# Patient Record
Sex: FEMALE | Race: WHITE | NOT HISPANIC OR LATINO | Employment: FULL TIME | URBAN - METROPOLITAN AREA
[De-identification: names, ages, dates, MRNs, and addresses within clinical notes are randomized per-mention and may not be internally consistent; named-entity substitution may affect disease eponyms.]

---

## 2023-07-06 DIAGNOSIS — E11.9 TYPE 2 DIABETES MELLITUS WITHOUT COMPLICATION, UNSPECIFIED WHETHER LONG TERM INSULIN USE (MULTI): ICD-10-CM

## 2023-07-06 DIAGNOSIS — I10 BENIGN ESSENTIAL HYPERTENSION: Primary | ICD-10-CM

## 2023-07-06 RX ORDER — LISINOPRIL 10 MG/1
10 TABLET ORAL DAILY
Qty: 90 TABLET | Refills: 0 | Status: SHIPPED | OUTPATIENT
Start: 2023-07-06 | End: 2023-07-06

## 2023-07-06 RX ORDER — METFORMIN HYDROCHLORIDE 1000 MG/1
1000 TABLET ORAL
Qty: 270 TABLET | Refills: 0 | Status: SHIPPED | OUTPATIENT
Start: 2023-07-06 | End: 2023-07-06

## 2023-07-06 RX ORDER — LISINOPRIL 10 MG/1
10 TABLET ORAL DAILY
COMMUNITY
Start: 2014-01-08 | End: 2023-07-06 | Stop reason: SDUPTHER

## 2023-07-06 RX ORDER — METFORMIN HYDROCHLORIDE 1000 MG/1
1000 TABLET ORAL
COMMUNITY
Start: 2015-05-21 | End: 2023-07-06 | Stop reason: SDUPTHER

## 2023-08-18 PROBLEM — K60.2 ANAL FISSURE: Status: ACTIVE | Noted: 2023-08-18

## 2023-08-18 PROBLEM — T75.3XXA MOTION SICKNESS: Status: ACTIVE | Noted: 2023-08-18

## 2023-08-18 PROBLEM — E55.9 VITAMIN D DEFICIENCY: Status: ACTIVE | Noted: 2023-08-18

## 2023-08-18 PROBLEM — M72.2 PLANTAR FASCIITIS: Status: ACTIVE | Noted: 2023-08-18

## 2023-08-18 PROBLEM — H52.202 ASTIGMATISM OF LEFT EYE: Status: ACTIVE | Noted: 2023-08-18

## 2023-08-18 PROBLEM — M25.562 BILATERAL KNEE PAIN: Status: ACTIVE | Noted: 2023-08-18

## 2023-08-18 PROBLEM — J84.10 PULMONARY FIBROSIS, UNSPECIFIED (MULTI): Status: ACTIVE | Noted: 2023-08-18

## 2023-08-18 PROBLEM — G56.00 CARPAL TUNNEL SYNDROME: Status: ACTIVE | Noted: 2023-08-18

## 2023-08-18 PROBLEM — H35.81 COTTON WOOL SPOTS: Status: ACTIVE | Noted: 2023-08-18

## 2023-08-18 PROBLEM — H35.032: Status: ACTIVE | Noted: 2023-08-18

## 2023-08-18 PROBLEM — H52.02 HYPERMETROPIA OF LEFT EYE: Status: ACTIVE | Noted: 2023-08-18

## 2023-08-18 PROBLEM — K40.91 RECURRENT RIGHT INGUINAL HERNIA: Status: ACTIVE | Noted: 2023-08-18

## 2023-08-18 PROBLEM — G89.29 CHRONIC LOW BACK PAIN: Status: ACTIVE | Noted: 2023-08-18

## 2023-08-18 PROBLEM — E11.9 T2DM (TYPE 2 DIABETES MELLITUS) (MULTI): Status: ACTIVE | Noted: 2023-08-18

## 2023-08-18 PROBLEM — E04.1 LEFT THYROID NODULE: Status: ACTIVE | Noted: 2023-08-18

## 2023-08-18 PROBLEM — R60.0 LEG EDEMA, RIGHT: Status: ACTIVE | Noted: 2023-08-18

## 2023-08-18 PROBLEM — M54.50 CHRONIC LOW BACK PAIN: Status: ACTIVE | Noted: 2023-08-18

## 2023-08-18 PROBLEM — K21.9 GERD WITHOUT ESOPHAGITIS: Status: ACTIVE | Noted: 2023-08-18

## 2023-08-18 PROBLEM — Z96.1 PSEUDOPHAKIA OF RIGHT EYE: Status: ACTIVE | Noted: 2023-08-18

## 2023-08-18 PROBLEM — E61.1 IRON DEFICIENCY: Status: ACTIVE | Noted: 2023-08-18

## 2023-08-18 PROBLEM — I10 HYPERTENSION: Status: ACTIVE | Noted: 2023-08-18

## 2023-08-18 PROBLEM — G47.33 OBSTRUCTIVE SLEEP APNEA: Status: ACTIVE | Noted: 2023-08-18

## 2023-08-18 PROBLEM — J39.2 THROAT DISORDER: Status: ACTIVE | Noted: 2023-08-18

## 2023-08-18 PROBLEM — H52.03 HYPEROPIA WITH PRESBYOPIA OF BOTH EYES: Status: ACTIVE | Noted: 2023-08-18

## 2023-08-18 PROBLEM — R21 RASH: Status: ACTIVE | Noted: 2023-08-18

## 2023-08-18 PROBLEM — M21.40 PES PLANUS, FLEXIBLE: Status: ACTIVE | Noted: 2023-08-18

## 2023-08-18 PROBLEM — M54.16 LUMBAR RADICULOPATHY: Status: ACTIVE | Noted: 2023-08-18

## 2023-08-18 PROBLEM — M25.561 BILATERAL KNEE PAIN: Status: ACTIVE | Noted: 2023-08-18

## 2023-08-18 PROBLEM — K62.89 ANAL PAIN: Status: ACTIVE | Noted: 2023-08-18

## 2023-08-18 PROBLEM — M76.829 POSTERIOR TIBIAL TENDON DYSFUNCTION: Status: ACTIVE | Noted: 2023-08-18

## 2023-08-18 PROBLEM — G47.00 INSOMNIA: Status: ACTIVE | Noted: 2023-08-18

## 2023-08-18 PROBLEM — Z98.890 HISTORY OF THYROID SURGERY: Status: ACTIVE | Noted: 2023-08-18

## 2023-08-18 PROBLEM — R06.02 SHORT OF BREATH ON EXERTION: Status: ACTIVE | Noted: 2023-08-18

## 2023-08-18 PROBLEM — E78.2 COMBINED HYPERLIPIDEMIA: Status: ACTIVE | Noted: 2023-08-18

## 2023-08-18 PROBLEM — E89.0 STATUS POST PARTIAL THYROIDECTOMY: Status: ACTIVE | Noted: 2023-08-18

## 2023-08-18 PROBLEM — H52.4 HYPEROPIA WITH PRESBYOPIA OF BOTH EYES: Status: ACTIVE | Noted: 2023-08-18

## 2023-08-18 PROBLEM — E66.01 MORBID OBESITY (MULTI): Status: ACTIVE | Noted: 2023-08-18

## 2023-08-18 PROBLEM — H25.041 POSTERIOR SUBCAPSULAR AGE-RELATED CATARACT, RIGHT EYE: Status: ACTIVE | Noted: 2023-08-18

## 2023-08-18 PROBLEM — E78.49 FAMILIAL COMBINED HYPERLIPIDEMIA: Status: ACTIVE | Noted: 2023-08-18

## 2023-08-18 RX ORDER — IBUPROFEN 200 MG
3 TABLET ORAL
COMMUNITY
Start: 2015-10-21

## 2023-08-18 RX ORDER — DAPAGLIFLOZIN 5 MG/1
1 TABLET, FILM COATED ORAL
COMMUNITY
Start: 2022-01-27 | End: 2024-01-30 | Stop reason: WASHOUT

## 2023-08-18 RX ORDER — BLOOD-GLUCOSE METER
EACH MISCELLANEOUS
COMMUNITY
Start: 2015-05-15 | End: 2024-01-24 | Stop reason: SDUPTHER

## 2023-08-18 RX ORDER — LISINOPRIL 20 MG/1
20 TABLET ORAL DAILY
COMMUNITY
End: 2024-01-30 | Stop reason: WASHOUT

## 2023-08-18 RX ORDER — MULTIVITAMIN
1 TABLET ORAL DAILY
COMMUNITY
End: 2024-01-30 | Stop reason: WASHOUT

## 2023-08-18 RX ORDER — NAFTIFINE HYDROCHLORIDE 20 MG/G
GEL TOPICAL
COMMUNITY
Start: 2021-08-23 | End: 2024-01-30 | Stop reason: WASHOUT

## 2023-08-18 RX ORDER — ALBUTEROL SULFATE 90 UG/1
AEROSOL, METERED RESPIRATORY (INHALATION)
COMMUNITY
Start: 2023-02-21 | End: 2024-01-30 | Stop reason: WASHOUT

## 2023-08-18 RX ORDER — ONDANSETRON 4 MG/1
1 TABLET, ORALLY DISINTEGRATING ORAL EVERY 4 HOURS PRN
COMMUNITY
Start: 2022-12-15 | End: 2024-01-24 | Stop reason: SDUPTHER

## 2023-08-18 RX ORDER — ONDANSETRON 8 MG/1
1 TABLET, ORALLY DISINTEGRATING ORAL EVERY 8 HOURS PRN
COMMUNITY
Start: 2015-12-21 | End: 2024-01-30 | Stop reason: WASHOUT

## 2023-08-18 RX ORDER — BENZONATATE 200 MG/1
CAPSULE ORAL
COMMUNITY
Start: 2023-02-21 | End: 2024-01-30 | Stop reason: WASHOUT

## 2023-08-18 RX ORDER — LISINOPRIL 5 MG/1
1 TABLET ORAL DAILY
COMMUNITY
End: 2024-01-30 | Stop reason: WASHOUT

## 2023-09-07 ENCOUNTER — TELEPHONE (OUTPATIENT)
Dept: PRIMARY CARE | Facility: CLINIC | Age: 63
End: 2023-09-07
Payer: COMMERCIAL

## 2023-09-07 DIAGNOSIS — Z00.00 HEALTH CARE MAINTENANCE: Primary | ICD-10-CM

## 2023-09-10 PROBLEM — H40.053 BILATERAL OCULAR HYPERTENSION: Status: ACTIVE | Noted: 2023-09-10

## 2023-09-15 ENCOUNTER — TELEPHONE (OUTPATIENT)
Dept: PRIMARY CARE | Facility: CLINIC | Age: 63
End: 2023-09-15
Payer: COMMERCIAL

## 2023-10-02 ENCOUNTER — TELEPHONE (OUTPATIENT)
Dept: PRIMARY CARE | Facility: CLINIC | Age: 63
End: 2023-10-02
Payer: COMMERCIAL

## 2023-10-08 DIAGNOSIS — Z00.00 HEALTH CARE MAINTENANCE: Primary | ICD-10-CM

## 2023-10-11 ENCOUNTER — TELEPHONE (OUTPATIENT)
Dept: ORTHOPEDIC SURGERY | Facility: HOSPITAL | Age: 63
End: 2023-10-11
Payer: COMMERCIAL

## 2023-10-13 ENCOUNTER — APPOINTMENT (OUTPATIENT)
Dept: ORTHOPEDIC SURGERY | Facility: HOSPITAL | Age: 63
End: 2023-10-13
Payer: COMMERCIAL

## 2023-10-13 ENCOUNTER — TELEPHONE (OUTPATIENT)
Dept: PRIMARY CARE | Facility: CLINIC | Age: 63
End: 2023-10-13
Payer: COMMERCIAL

## 2023-10-14 DIAGNOSIS — Z00.00 HEALTH CARE MAINTENANCE: Primary | ICD-10-CM

## 2023-10-24 ENCOUNTER — OFFICE VISIT (OUTPATIENT)
Dept: PRIMARY CARE | Facility: CLINIC | Age: 63
End: 2023-10-24
Payer: COMMERCIAL

## 2023-10-24 ENCOUNTER — PHARMACY VISIT (OUTPATIENT)
Dept: PHARMACY | Facility: CLINIC | Age: 63
End: 2023-10-24
Payer: COMMERCIAL

## 2023-10-24 ENCOUNTER — ANCILLARY PROCEDURE (OUTPATIENT)
Dept: RADIOLOGY | Facility: CLINIC | Age: 63
End: 2023-10-24
Payer: COMMERCIAL

## 2023-10-24 VITALS — SYSTOLIC BLOOD PRESSURE: 124 MMHG | WEIGHT: 293 LBS | BODY MASS INDEX: 60.94 KG/M2 | DIASTOLIC BLOOD PRESSURE: 72 MMHG

## 2023-10-24 DIAGNOSIS — E78.2 COMBINED HYPERLIPIDEMIA: ICD-10-CM

## 2023-10-24 DIAGNOSIS — I10 PRIMARY HYPERTENSION: ICD-10-CM

## 2023-10-24 DIAGNOSIS — I10 BENIGN ESSENTIAL HYPERTENSION: ICD-10-CM

## 2023-10-24 DIAGNOSIS — I10 PRIMARY HYPERTENSION: Primary | ICD-10-CM

## 2023-10-24 DIAGNOSIS — R06.02 SHORT OF BREATH ON EXERTION: ICD-10-CM

## 2023-10-24 DIAGNOSIS — G47.33 OBSTRUCTIVE SLEEP APNEA: ICD-10-CM

## 2023-10-24 PROCEDURE — 99214 OFFICE O/P EST MOD 30 MIN: CPT | Performed by: INTERNAL MEDICINE

## 2023-10-24 PROCEDURE — 3074F SYST BP LT 130 MM HG: CPT | Performed by: INTERNAL MEDICINE

## 2023-10-24 PROCEDURE — 3078F DIAST BP <80 MM HG: CPT | Performed by: INTERNAL MEDICINE

## 2023-10-24 PROCEDURE — 71046 X-RAY EXAM CHEST 2 VIEWS: CPT | Performed by: RADIOLOGY

## 2023-10-24 PROCEDURE — 4010F ACE/ARB THERAPY RXD/TAKEN: CPT | Performed by: INTERNAL MEDICINE

## 2023-10-24 PROCEDURE — RXMED WILLOW AMBULATORY MEDICATION CHARGE

## 2023-10-24 PROCEDURE — 71046 X-RAY EXAM CHEST 2 VIEWS: CPT | Mod: FY

## 2023-10-24 RX ORDER — LISINOPRIL 20 MG/1
20 TABLET ORAL DAILY
Qty: 90 TABLET | Refills: 1 | Status: SHIPPED | OUTPATIENT
Start: 2023-10-24 | End: 2024-04-15 | Stop reason: SDUPTHER

## 2023-10-24 RX ORDER — METFORMIN HYDROCHLORIDE 1000 MG/1
1000 TABLET ORAL
Qty: 180 TABLET | Refills: 1 | Status: SHIPPED | OUTPATIENT
Start: 2023-10-24 | End: 2024-04-15 | Stop reason: SDUPTHER

## 2023-10-24 NOTE — PROGRESS NOTES
Subjective   Patient ID: Cathryn Romeo is a 63 y.o. female who presents for No chief complaint on file..    HPI follow-up visit and sick visit no chest pain but has become more short of breath recall at some type of interstitial lung disease in the past was recently on steroids for sciatica towards the right although it did not help her lungs she when walking will have to stop several times to catch breath although has not been wheezing coughing up phlegm had fever she did have COVID earlier in the summer but not proximal to when she began being short of breath she has gained some weight    Review of Systems    Objective   There were no vitals taken for this visit.    Physical Exam vital signs noted alert and oriented x3 NCAT color is good no JVD chest clear to auscultation no wheezing no crackles CV regular rate and rhythm S1-S2 without murmur gallop or rub abdomen obese soft nontender normal active bowel sounds extremities no clubbing cyanosis or edema normal distal pulses    Assessment/Plan impression hypertension dyspnea KARLEE other diagnoses hyperlipidemia  Plan her lisinopril had been increased to 20 mg at her GYN visit will renew diet weight loss exercise as able and to effect change with the lipids chest x-ray PA lateral requisition made follow-up with echocardiogram if normal given history of other findings back hygiene back stretches less residual sciatic type discomfort review prior laboratory results and recheck for regular visit continue with CPAP therapy  tt40 cc21

## 2023-10-25 DIAGNOSIS — E11.9 TYPE 2 DIABETES MELLITUS WITHOUT COMPLICATION, UNSPECIFIED WHETHER LONG TERM INSULIN USE (MULTI): ICD-10-CM

## 2023-10-25 RX ORDER — LISINOPRIL 10 MG/1
10 TABLET ORAL DAILY
Qty: 90 TABLET | Refills: 0 | OUTPATIENT
Start: 2023-10-25 | End: 2024-10-24

## 2023-10-26 ENCOUNTER — APPOINTMENT (OUTPATIENT)
Dept: SLEEP MEDICINE | Facility: CLINIC | Age: 63
End: 2023-10-26
Payer: COMMERCIAL

## 2023-11-07 ENCOUNTER — APPOINTMENT (OUTPATIENT)
Dept: ORTHOPEDIC SURGERY | Facility: HOSPITAL | Age: 63
End: 2023-11-07
Payer: COMMERCIAL

## 2023-11-08 ENCOUNTER — TELEPHONE (OUTPATIENT)
Dept: PRIMARY CARE | Facility: CLINIC | Age: 63
End: 2023-11-08

## 2023-11-12 DIAGNOSIS — R06.02 SHORT OF BREATH ON EXERTION: Primary | ICD-10-CM

## 2023-11-12 DIAGNOSIS — I10 BENIGN ESSENTIAL HYPERTENSION: ICD-10-CM

## 2023-11-12 DIAGNOSIS — G47.33 OBSTRUCTIVE SLEEP APNEA: ICD-10-CM

## 2023-12-01 ENCOUNTER — HOSPITAL ENCOUNTER (OUTPATIENT)
Dept: CARDIOLOGY | Facility: HOSPITAL | Age: 63
Discharge: HOME | End: 2023-12-01
Payer: COMMERCIAL

## 2023-12-01 DIAGNOSIS — G47.33 OBSTRUCTIVE SLEEP APNEA: ICD-10-CM

## 2023-12-01 DIAGNOSIS — R06.00 DYSPNEA, UNSPECIFIED: ICD-10-CM

## 2023-12-01 DIAGNOSIS — R06.02 SHORT OF BREATH ON EXERTION: ICD-10-CM

## 2023-12-01 DIAGNOSIS — I10 BENIGN ESSENTIAL HYPERTENSION: ICD-10-CM

## 2023-12-01 LAB
AORTIC VALVE MEAN GRADIENT: 7
AORTIC VALVE PEAK VELOCITY: 1.83
AV PEAK GRADIENT: 13.4
AVA (PEAK VEL): 2.73
AVA (VTI): 2.66
EJECTION FRACTION APICAL 4 CHAMBER: 65.5
EJECTION FRACTION: 67
LEFT ATRIUM VOLUME AREA LENGTH INDEX BSA: 23.6
LEFT VENTRICLE INTERNAL DIMENSION DIASTOLE: 4.1 (ref 3.5–6)
LEFT VENTRICULAR OUTFLOW TRACT DIAMETER: 2.1
MITRAL VALVE E/A RATIO: 0.69
MITRAL VALVE E/E' RATIO: 8.7
RIGHT VENTRICLE PEAK SYSTOLIC PRESSURE: 15.5
TRICUSPID ANNULAR PLANE SYSTOLIC EXCURSION: 2.5

## 2023-12-01 PROCEDURE — 93306 TTE W/DOPPLER COMPLETE: CPT | Performed by: INTERNAL MEDICINE

## 2023-12-01 PROCEDURE — 93306 TTE W/DOPPLER COMPLETE: CPT

## 2023-12-08 ENCOUNTER — OFFICE VISIT (OUTPATIENT)
Dept: OPHTHALMOLOGY | Facility: CLINIC | Age: 63
End: 2023-12-08
Payer: COMMERCIAL

## 2023-12-08 DIAGNOSIS — E11.9 CONTROLLED TYPE 2 DIABETES MELLITUS WITHOUT COMPLICATION, UNSPECIFIED WHETHER LONG TERM INSULIN USE (MULTI): ICD-10-CM

## 2023-12-08 DIAGNOSIS — H40.003 GLAUCOMA SUSPECT OF BOTH EYES: Primary | ICD-10-CM

## 2023-12-08 PROCEDURE — RXMED WILLOW AMBULATORY MEDICATION CHARGE

## 2023-12-08 PROCEDURE — 92083 EXTENDED VISUAL FIELD XM: CPT | Performed by: OPHTHALMOLOGY

## 2023-12-08 PROCEDURE — 92134 CPTRZ OPH DX IMG PST SGM RTA: CPT | Mod: BILATERAL PROCEDURE | Performed by: OPHTHALMOLOGY

## 2023-12-08 PROCEDURE — 4010F ACE/ARB THERAPY RXD/TAKEN: CPT | Performed by: OPHTHALMOLOGY

## 2023-12-08 PROCEDURE — 99214 OFFICE O/P EST MOD 30 MIN: CPT | Performed by: OPHTHALMOLOGY

## 2023-12-08 RX ORDER — LATANOPROST 50 UG/ML
1 SOLUTION/ DROPS OPHTHALMIC NIGHTLY
Qty: 5 ML | Refills: 1 | Status: SHIPPED | OUTPATIENT
Start: 2023-12-08 | End: 2024-05-08 | Stop reason: WASHOUT

## 2023-12-08 RX ORDER — CLOTRIMAZOLE AND BETAMETHASONE DIPROPIONATE 10; .64 MG/G; MG/G
CREAM TOPICAL
COMMUNITY
Start: 2006-03-28 | End: 2024-01-30 | Stop reason: WASHOUT

## 2023-12-08 RX ORDER — ASPIRIN 325 MG
TABLET, DELAYED RELEASE (ENTERIC COATED) ORAL
COMMUNITY
Start: 2011-02-22 | End: 2024-01-30 | Stop reason: WASHOUT

## 2023-12-08 ASSESSMENT — REFRACTION_WEARINGRX
OS_ADD: +2.50
OD_CYLINDER: -1.25
OS_AXIS: 095
OD_AXIS: 096
OD_ADD: +2.50
OD_SPHERE: PLANO
OS_SPHERE: +0.25
OS_CYLINDER: -2.25

## 2023-12-08 ASSESSMENT — REFRACTION_MANIFEST
OS_AXIS: 090
OD_AXIS: 090
OS_AXIS: 090
OD_SPHERE: +1.00
OD_CYLINDER: -1.00
OS_CYLINDER: -2.50
OS_SPHERE: +1.25
OD_SPHERE: PLANO
OD_AXIS: 090
OD_CYLINDER: -2.25
OS_SPHERE: +0.75
OS_CYLINDER: -2.00

## 2023-12-08 ASSESSMENT — CONF VISUAL FIELD
OD_SUPERIOR_TEMPORAL_RESTRICTION: 0
METHOD: COUNTING FINGERS
OD_INFERIOR_TEMPORAL_RESTRICTION: 0
OD_SUPERIOR_NASAL_RESTRICTION: 0
OS_NORMAL: 1
OS_SUPERIOR_TEMPORAL_RESTRICTION: 0
OS_INFERIOR_TEMPORAL_RESTRICTION: 0
OS_SUPERIOR_NASAL_RESTRICTION: 0
OD_NORMAL: 1
OD_INFERIOR_NASAL_RESTRICTION: 0
OS_INFERIOR_NASAL_RESTRICTION: 0

## 2023-12-08 ASSESSMENT — VISUAL ACUITY
OD_CC+: -3
OD_CC: 20/20
OS_CC+: -2
OS_CC: 20/20
CORRECTION_TYPE: GLASSES
METHOD: SNELLEN - LINEAR

## 2023-12-08 ASSESSMENT — CUP TO DISC RATIO
OS_RATIO: .3
OD_RATIO: .3

## 2023-12-08 ASSESSMENT — EXTERNAL EXAM - LEFT EYE: OS_EXAM: NORMAL

## 2023-12-08 ASSESSMENT — ENCOUNTER SYMPTOMS
RESPIRATORY NEGATIVE: 0
CONSTITUTIONAL NEGATIVE: 0
NEUROLOGICAL NEGATIVE: 0
GASTROINTESTINAL NEGATIVE: 0
EYES NEGATIVE: 0
MUSCULOSKELETAL NEGATIVE: 0
HEMATOLOGIC/LYMPHATIC NEGATIVE: 0
CARDIOVASCULAR NEGATIVE: 0
ALLERGIC/IMMUNOLOGIC NEGATIVE: 0
PSYCHIATRIC NEGATIVE: 0
ENDOCRINE NEGATIVE: 0

## 2023-12-08 ASSESSMENT — TONOMETRY
IOP_METHOD: GOLDMANN APPLANATION
OS_IOP_MMHG: 24
OD_IOP_MMHG: 23

## 2023-12-08 ASSESSMENT — PACHYMETRY
OD_CT(UM): 594
OS_CT(UM): 602

## 2023-12-08 ASSESSMENT — EXTERNAL EXAM - RIGHT EYE: OD_EXAM: NORMAL

## 2023-12-08 ASSESSMENT — SLIT LAMP EXAM - LIDS
COMMENTS: GOOD POSITION
COMMENTS: GOOD POSITION

## 2023-12-08 NOTE — PROGRESS NOTES
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

## 2023-12-11 ENCOUNTER — PHARMACY VISIT (OUTPATIENT)
Dept: PHARMACY | Facility: CLINIC | Age: 63
End: 2023-12-11
Payer: COMMERCIAL

## 2023-12-15 ENCOUNTER — APPOINTMENT (OUTPATIENT)
Dept: ORTHOPEDIC SURGERY | Facility: CLINIC | Age: 63
End: 2023-12-15
Payer: COMMERCIAL

## 2023-12-18 ENCOUNTER — APPOINTMENT (OUTPATIENT)
Dept: PRIMARY CARE | Facility: CLINIC | Age: 63
End: 2023-12-18
Payer: COMMERCIAL

## 2024-01-03 ENCOUNTER — APPOINTMENT (OUTPATIENT)
Dept: SLEEP MEDICINE | Facility: CLINIC | Age: 64
End: 2024-01-03
Payer: COMMERCIAL

## 2024-01-09 ENCOUNTER — APPOINTMENT (OUTPATIENT)
Dept: ORTHOPEDIC SURGERY | Facility: HOSPITAL | Age: 64
End: 2024-01-09
Payer: COMMERCIAL

## 2024-01-09 ASSESSMENT — PROMIS GLOBAL HEALTH SCALE
RATE_PHYSICAL_HEALTH: FAIR
RATE_AVERAGE_PAIN: 7
RATE_MENTAL_HEALTH: VERY GOOD
RATE_AVERAGE_FATIGUE: MODERATE
RATE_QUALITY_OF_LIFE: FAIR
CARRYOUT_PHYSICAL_ACTIVITIES: MODERATELY
CARRYOUT_SOCIAL_ACTIVITIES: GOOD
RATE_GENERAL_HEALTH: FAIR
RATE_SOCIAL_SATISFACTION: VERY GOOD
EMOTIONAL_PROBLEMS: NEVER

## 2024-01-11 ENCOUNTER — OFFICE VISIT (OUTPATIENT)
Dept: PRIMARY CARE | Facility: CLINIC | Age: 64
End: 2024-01-11
Payer: COMMERCIAL

## 2024-01-11 VITALS — WEIGHT: 293 LBS | DIASTOLIC BLOOD PRESSURE: 76 MMHG | BODY MASS INDEX: 60.94 KG/M2 | SYSTOLIC BLOOD PRESSURE: 141 MMHG

## 2024-01-11 DIAGNOSIS — G47.33 OBSTRUCTIVE SLEEP APNEA: ICD-10-CM

## 2024-01-11 DIAGNOSIS — E78.2 COMBINED HYPERLIPIDEMIA: ICD-10-CM

## 2024-01-11 DIAGNOSIS — I10 BENIGN ESSENTIAL HYPERTENSION: ICD-10-CM

## 2024-01-11 DIAGNOSIS — Z00.00 HEALTH CARE MAINTENANCE: Primary | ICD-10-CM

## 2024-01-11 DIAGNOSIS — J84.10 PULMONARY FIBROSIS, UNSPECIFIED (MULTI): ICD-10-CM

## 2024-01-11 DIAGNOSIS — E11.9 TYPE 2 DIABETES MELLITUS WITHOUT COMPLICATION, WITHOUT LONG-TERM CURRENT USE OF INSULIN (MULTI): ICD-10-CM

## 2024-01-11 DIAGNOSIS — E66.01 MORBID OBESITY (MULTI): ICD-10-CM

## 2024-01-11 LAB
ALBUMIN SERPL BCP-MCNC: 4.3 G/DL (ref 3.4–5)
ALP SERPL-CCNC: 75 U/L (ref 33–136)
ALT SERPL W P-5'-P-CCNC: 22 U/L (ref 7–45)
ANION GAP SERPL CALC-SCNC: 16 MMOL/L (ref 10–20)
AST SERPL W P-5'-P-CCNC: 18 U/L (ref 9–39)
BILIRUB SERPL-MCNC: 0.3 MG/DL (ref 0–1.2)
BUN SERPL-MCNC: 15 MG/DL (ref 6–23)
CALCIUM SERPL-MCNC: 9.8 MG/DL (ref 8.6–10.6)
CHLORIDE SERPL-SCNC: 102 MMOL/L (ref 98–107)
CHOLEST SERPL-MCNC: 204 MG/DL (ref 0–199)
CHOLESTEROL/HDL RATIO: 4.1
CO2 SERPL-SCNC: 25 MMOL/L (ref 21–32)
CREAT SERPL-MCNC: 0.62 MG/DL (ref 0.5–1.05)
EGFRCR SERPLBLD CKD-EPI 2021: >90 ML/MIN/1.73M*2
ERYTHROCYTE [DISTWIDTH] IN BLOOD BY AUTOMATED COUNT: 13.9 % (ref 11.5–14.5)
EST. AVERAGE GLUCOSE BLD GHB EST-MCNC: 126 MG/DL
GLUCOSE SERPL-MCNC: 140 MG/DL (ref 74–99)
HBA1C MFR BLD: 6 %
HCT VFR BLD AUTO: 46.7 % (ref 36–46)
HDLC SERPL-MCNC: 49.3 MG/DL
HGB BLD-MCNC: 14.2 G/DL (ref 12–16)
LDLC SERPL CALC-MCNC: 106 MG/DL
MCH RBC QN AUTO: 27.8 PG (ref 26–34)
MCHC RBC AUTO-ENTMCNC: 30.4 G/DL (ref 32–36)
MCV RBC AUTO: 92 FL (ref 80–100)
NON HDL CHOLESTEROL: 155 MG/DL (ref 0–149)
NRBC BLD-RTO: 0 /100 WBCS (ref 0–0)
PLATELET # BLD AUTO: 256 X10*3/UL (ref 150–450)
POTASSIUM SERPL-SCNC: 4.2 MMOL/L (ref 3.5–5.3)
PROT SERPL-MCNC: 7.5 G/DL (ref 6.4–8.2)
RBC # BLD AUTO: 5.1 X10*6/UL (ref 4–5.2)
SODIUM SERPL-SCNC: 139 MMOL/L (ref 136–145)
TRIGL SERPL-MCNC: 242 MG/DL (ref 0–149)
VLDL: 48 MG/DL (ref 0–40)
WBC # BLD AUTO: 8.3 X10*3/UL (ref 4.4–11.3)

## 2024-01-11 PROCEDURE — 99396 PREV VISIT EST AGE 40-64: CPT | Performed by: INTERNAL MEDICINE

## 2024-01-11 PROCEDURE — 3078F DIAST BP <80 MM HG: CPT | Performed by: INTERNAL MEDICINE

## 2024-01-11 PROCEDURE — 36415 COLL VENOUS BLD VENIPUNCTURE: CPT

## 2024-01-11 PROCEDURE — 80053 COMPREHEN METABOLIC PANEL: CPT

## 2024-01-11 PROCEDURE — 80061 LIPID PANEL: CPT

## 2024-01-11 PROCEDURE — 93000 ELECTROCARDIOGRAM COMPLETE: CPT | Performed by: INTERNAL MEDICINE

## 2024-01-11 PROCEDURE — 3044F HG A1C LEVEL LT 7.0%: CPT | Performed by: INTERNAL MEDICINE

## 2024-01-11 PROCEDURE — 4010F ACE/ARB THERAPY RXD/TAKEN: CPT | Performed by: INTERNAL MEDICINE

## 2024-01-11 PROCEDURE — 85027 COMPLETE CBC AUTOMATED: CPT

## 2024-01-11 PROCEDURE — 83036 HEMOGLOBIN GLYCOSYLATED A1C: CPT

## 2024-01-11 PROCEDURE — 3049F LDL-C 100-129 MG/DL: CPT | Performed by: INTERNAL MEDICINE

## 2024-01-11 PROCEDURE — 3077F SYST BP >= 140 MM HG: CPT | Performed by: INTERNAL MEDICINE

## 2024-01-11 NOTE — PROGRESS NOTES
Subjective   Patient ID: Cathryn Romeo is a 63 y.o. female who presents for No chief complaint on file..    HPI regularCPE see updated front sheet no chest pain no shortness of breath but always somewhat short of breath hide her echocardiogram had chest x-ray has had pulmonary fibrosis in the past may obtain chest CT and follow-up no side effect with medication bowels normal    Past medical history morbid obesity hypertension hyperlipidemia KARLEE diabetes mellitus medications noted and unchanged    Allergies noted and unchanged    Social history no tobacco    Family history noted and unchanged        Prevention some exercise some prior blood work reviewed discussed with colonoscopy mammogram echocardiogram     Review of Systems    Objective   There were no vitals taken for this visit.    Physical Exam vital signs noted alert and oriented x 3 NCAT PERRLA EOMI nares without discharge OP benign TM normal bilateral EAC clear bilateral no AC nodes no JVD or bruit no thyromegaly chest clear to auscultation and percussion CV regular rate and rhythm S1-S2 without murmur gallop or rub abdomen obese soft nontender normal active bowel sounds no rebound or guarding no HSM LS spine normal curvature negative straight leg raise negative logroll negative SI joint tenderness extremities no clubbing cyanosis or edema normal distal pulses DTR 2+    Assessment/Plan impression General medical examination diabetes mellitus KARLEE hypertension hyperlipidemia morbid obesity pulmonary fibrosis  Plan check EKG advised on heart check CT scan noncontrasted requisition made and advised on lungs consider pulmonary consult the pulmonary artery pressure on the echocardiogram was normal check A1c advised on long-term blood sugar test check comprehensive panel advised on glucose potassium and kidney function as well as liver function test check CBC advised on blood count check lipid panel advised on cholesterol profile diet weight loss exercise  consideration for Mounjaro after her test good water consumption recheck based on above TT 60 cc 31

## 2024-01-16 PROCEDURE — RXMED WILLOW AMBULATORY MEDICATION CHARGE

## 2024-01-17 ENCOUNTER — PHARMACY VISIT (OUTPATIENT)
Dept: PHARMACY | Facility: CLINIC | Age: 64
End: 2024-01-17
Payer: COMMERCIAL

## 2024-01-23 PROCEDURE — RXMED WILLOW AMBULATORY MEDICATION CHARGE

## 2024-01-24 ENCOUNTER — PHARMACY VISIT (OUTPATIENT)
Dept: PHARMACY | Facility: CLINIC | Age: 64
End: 2024-01-24
Payer: COMMERCIAL

## 2024-01-24 DIAGNOSIS — E11.9 TYPE 2 DIABETES MELLITUS WITHOUT COMPLICATION, WITHOUT LONG-TERM CURRENT USE OF INSULIN (MULTI): ICD-10-CM

## 2024-01-24 DIAGNOSIS — Z00.00 HEALTH CARE MAINTENANCE: Primary | ICD-10-CM

## 2024-01-24 RX ORDER — BLOOD-GLUCOSE METER
EACH MISCELLANEOUS
Qty: 100 STRIP | Refills: 1 | Status: SHIPPED | OUTPATIENT
Start: 2024-01-24

## 2024-01-24 RX ORDER — ONDANSETRON 4 MG/1
4 TABLET, ORALLY DISINTEGRATING ORAL EVERY 4 HOURS PRN
Qty: 20 TABLET | Refills: 0 | Status: SHIPPED | OUTPATIENT
Start: 2024-01-24

## 2024-01-25 ENCOUNTER — PHARMACY VISIT (OUTPATIENT)
Dept: PHARMACY | Facility: CLINIC | Age: 64
End: 2024-01-25
Payer: COMMERCIAL

## 2024-01-25 PROCEDURE — RXMED WILLOW AMBULATORY MEDICATION CHARGE

## 2024-01-29 ENCOUNTER — HOSPITAL ENCOUNTER (OUTPATIENT)
Dept: RADIOLOGY | Facility: CLINIC | Age: 64
Discharge: HOME | End: 2024-01-29
Payer: COMMERCIAL

## 2024-01-29 DIAGNOSIS — J84.10 PULMONARY FIBROSIS, UNSPECIFIED (MULTI): ICD-10-CM

## 2024-01-29 PROCEDURE — 71250 CT THORAX DX C-: CPT | Performed by: STUDENT IN AN ORGANIZED HEALTH CARE EDUCATION/TRAINING PROGRAM

## 2024-01-29 PROCEDURE — 71250 CT THORAX DX C-: CPT

## 2024-01-30 ENCOUNTER — OFFICE VISIT (OUTPATIENT)
Dept: OTOLARYNGOLOGY | Facility: CLINIC | Age: 64
End: 2024-01-30
Payer: COMMERCIAL

## 2024-01-30 VITALS — BODY MASS INDEX: 51.91 KG/M2 | HEIGHT: 63 IN | WEIGHT: 293 LBS

## 2024-01-30 DIAGNOSIS — K21.9 GASTROESOPHAGEAL REFLUX DISEASE WITHOUT ESOPHAGITIS: ICD-10-CM

## 2024-01-30 DIAGNOSIS — J37.0 CHRONIC LARYNGITIS: Primary | ICD-10-CM

## 2024-01-30 DIAGNOSIS — J30.89 NON-SEASONAL ALLERGIC RHINITIS, UNSPECIFIED TRIGGER: ICD-10-CM

## 2024-01-30 DIAGNOSIS — R49.0 CHRONIC HOARSENESS: ICD-10-CM

## 2024-01-30 PROCEDURE — 4010F ACE/ARB THERAPY RXD/TAKEN: CPT | Performed by: OTOLARYNGOLOGY

## 2024-01-30 PROCEDURE — RXMED WILLOW AMBULATORY MEDICATION CHARGE

## 2024-01-30 PROCEDURE — 3049F LDL-C 100-129 MG/DL: CPT | Performed by: OTOLARYNGOLOGY

## 2024-01-30 PROCEDURE — 31575 DIAGNOSTIC LARYNGOSCOPY: CPT | Performed by: OTOLARYNGOLOGY

## 2024-01-30 PROCEDURE — 3044F HG A1C LEVEL LT 7.0%: CPT | Performed by: OTOLARYNGOLOGY

## 2024-01-30 PROCEDURE — 99204 OFFICE O/P NEW MOD 45 MIN: CPT | Performed by: OTOLARYNGOLOGY

## 2024-01-30 RX ORDER — FLUTICASONE PROPIONATE 50 MCG
2 SPRAY, SUSPENSION (ML) NASAL DAILY
Qty: 48 G | Refills: 0 | Status: SHIPPED | OUTPATIENT
Start: 2024-01-30 | End: 2024-04-09 | Stop reason: SDUPTHER

## 2024-01-30 RX ORDER — AZELASTINE 1 MG/ML
2 SPRAY, METERED NASAL 2 TIMES DAILY
Qty: 90 ML | Refills: 0 | Status: SHIPPED | OUTPATIENT
Start: 2024-01-30 | End: 2024-04-13 | Stop reason: SDUPTHER

## 2024-01-30 RX ORDER — OMEPRAZOLE 40 MG/1
40 CAPSULE, DELAYED RELEASE ORAL DAILY
Qty: 90 CAPSULE | Refills: 0 | Status: SHIPPED | OUTPATIENT
Start: 2024-01-30 | End: 2024-04-09 | Stop reason: SDUPTHER

## 2024-01-30 ASSESSMENT — ENCOUNTER SYMPTOMS
FATIGUE: 1
HEADACHES: 1
SHORTNESS OF BREATH: 1
RHINORRHEA: 1
COUGH: 1

## 2024-01-30 NOTE — PROGRESS NOTES
Subjective   Patient ID: Cathryn Romeo is a 63 y.o. female  HPI  Patient is complaining of a chronic history of irritation in her throat with frequent clearing of the throat.  She also complains of chronic postnasal drainage.  She has no purulence from her nose and no facial pain.  She has no hemoptysis or dysphagia.  She does complain of mild hoarseness associated with the throat clearing.  Review of Systems   Constitutional:  Positive for fatigue.   HENT:  Positive for congestion, hearing loss, postnasal drip and rhinorrhea.    Respiratory:  Positive for cough and shortness of breath.    Neurological:  Positive for headaches.       Objective   Physical Exam  The following elements of a brief ear nose and throat exam were performed: External ear canals and tympanic membranes, external nose and nasal passages, oral cavity, palpation of the neck, percussion of the face, palpation of the thyroid.    There is nasal edema and the turbinates are pale.  There is no purulence or facial tenderness noted.  Indirect mirror laryngoscopy is not possible due to a strong gag reflex and the tonsils are surgically absent.  She does have severe elevation of the BMI at 61.  The remainder of her exam was within normal limits.  Fiberoptic laryngoscopy was offered in light of the chronic laryngeal irritation and mild dysphonia.  The nasal cavity and nasopharynx and hypopharynx were noted to be clear.  There was good vocal cord motion bilaterally with posterior interarytenoid edema.    Laryngoscopy    Date/Time: 1/30/2024 11:50 AM    Performed by: Sahil Bailey MD  Authorized by: Sahil Bailey MD    Consent:     Consent obtained:  Verbal    Risks discussed:  Pain and infection  Procedure details:     Indications: hoarseness, dysphagia, or aspiration      Meds administered: Lidocaine and Afrin nasal sprays.    Instrument: flexible fiberoptic laryngoscope      Scope location: bilateral nare        Assessment/Plan   Diagnoses and all  orders for this visit:  Chronic laryngitis (Primary)  Chronic hoarseness  -     Laryngoscopy  Non-seasonal allergic rhinitis, unspecified trigger  -     fluticasone (Flonase) 50 mcg/actuation nasal spray; Administer 2 sprays into each nostril once daily. Shake gently. Before first use, prime pump. After use, clean tip and replace cap.  -     azelastine (Astelin) 137 mcg (0.1 %) nasal spray; Administer 2 sprays into each nostril 2 times a day.  Gastroesophageal reflux disease without esophagitis  -     omeprazole (PriLOSEC) 40 mg DR capsule; Take 1 capsule (40 mg) by mouth once daily. Do not crush or chew.     1.  Chronic allergic rhinitis with postnasal drainage.  The patient was started on Flonase and Astelin nasal sprays.  2.  Chronic gastroesophageal reflux with chronic laryngitis exacerbated by the severe elevation of the BMI.  The patient was started on omeprazole at 40 mg/day and she was advised to follow acid reflux precautions and consider weight reduction.  She will follow-up in 1 month.

## 2024-01-31 PROCEDURE — RXMED WILLOW AMBULATORY MEDICATION CHARGE

## 2024-02-02 ENCOUNTER — TELEPHONE (OUTPATIENT)
Dept: PRIMARY CARE | Facility: CLINIC | Age: 64
End: 2024-02-02
Payer: COMMERCIAL

## 2024-02-05 ENCOUNTER — PHARMACY VISIT (OUTPATIENT)
Dept: PHARMACY | Facility: CLINIC | Age: 64
End: 2024-02-05
Payer: COMMERCIAL

## 2024-02-06 DIAGNOSIS — D73.5 SPLENIC INFARCT: Primary | ICD-10-CM

## 2024-02-20 ENCOUNTER — HOSPITAL ENCOUNTER (OUTPATIENT)
Dept: RADIOLOGY | Facility: CLINIC | Age: 64
End: 2024-02-20
Payer: COMMERCIAL

## 2024-02-28 ENCOUNTER — HOSPITAL ENCOUNTER (OUTPATIENT)
Dept: RADIOLOGY | Facility: CLINIC | Age: 64
Discharge: HOME | End: 2024-02-28
Payer: COMMERCIAL

## 2024-02-28 DIAGNOSIS — D73.5 SPLENIC INFARCT: ICD-10-CM

## 2024-02-28 PROCEDURE — 76700 US EXAM ABDOM COMPLETE: CPT

## 2024-02-28 PROCEDURE — 76700 US EXAM ABDOM COMPLETE: CPT | Performed by: RADIOLOGY

## 2024-02-29 ENCOUNTER — APPOINTMENT (OUTPATIENT)
Dept: OTOLARYNGOLOGY | Facility: CLINIC | Age: 64
End: 2024-02-29
Payer: COMMERCIAL

## 2024-03-07 ENCOUNTER — TELEPHONE (OUTPATIENT)
Dept: PRIMARY CARE | Facility: CLINIC | Age: 64
End: 2024-03-07

## 2024-03-07 ENCOUNTER — APPOINTMENT (OUTPATIENT)
Dept: OTOLARYNGOLOGY | Facility: CLINIC | Age: 64
End: 2024-03-07
Payer: COMMERCIAL

## 2024-03-17 DIAGNOSIS — Q45.3 ANOMALY OF PANCREAS: Primary | ICD-10-CM

## 2024-04-09 ENCOUNTER — OFFICE VISIT (OUTPATIENT)
Dept: OTOLARYNGOLOGY | Facility: CLINIC | Age: 64
End: 2024-04-09
Payer: COMMERCIAL

## 2024-04-09 DIAGNOSIS — J30.89 NON-SEASONAL ALLERGIC RHINITIS, UNSPECIFIED TRIGGER: ICD-10-CM

## 2024-04-09 DIAGNOSIS — R49.0 CHRONIC HOARSENESS: ICD-10-CM

## 2024-04-09 DIAGNOSIS — J37.0 CHRONIC LARYNGITIS: ICD-10-CM

## 2024-04-09 DIAGNOSIS — K21.9 GASTROESOPHAGEAL REFLUX DISEASE WITHOUT ESOPHAGITIS: Primary | ICD-10-CM

## 2024-04-09 PROCEDURE — 3049F LDL-C 100-129 MG/DL: CPT | Performed by: OTOLARYNGOLOGY

## 2024-04-09 PROCEDURE — RXMED WILLOW AMBULATORY MEDICATION CHARGE

## 2024-04-09 PROCEDURE — 99214 OFFICE O/P EST MOD 30 MIN: CPT | Performed by: OTOLARYNGOLOGY

## 2024-04-09 PROCEDURE — 3044F HG A1C LEVEL LT 7.0%: CPT | Performed by: OTOLARYNGOLOGY

## 2024-04-09 PROCEDURE — 1036F TOBACCO NON-USER: CPT | Performed by: OTOLARYNGOLOGY

## 2024-04-09 PROCEDURE — 4010F ACE/ARB THERAPY RXD/TAKEN: CPT | Performed by: OTOLARYNGOLOGY

## 2024-04-09 RX ORDER — MINERAL OIL
180 ENEMA (ML) RECTAL DAILY PRN
Qty: 90 TABLET | Refills: 1 | Status: SHIPPED | OUTPATIENT
Start: 2024-04-09

## 2024-04-09 RX ORDER — OMEPRAZOLE 40 MG/1
40 CAPSULE, DELAYED RELEASE ORAL DAILY
Qty: 90 CAPSULE | Refills: 1 | Status: SHIPPED | OUTPATIENT
Start: 2024-04-09

## 2024-04-09 RX ORDER — FLUTICASONE PROPIONATE 50 MCG
2 SPRAY, SUSPENSION (ML) NASAL DAILY
Qty: 48 G | Refills: 1 | Status: SHIPPED | OUTPATIENT
Start: 2024-04-09

## 2024-04-09 NOTE — PROGRESS NOTES
Subjective   Patient ID: Cathryn Romeo is a 63 y.o. female  HPI  Patient presents for follow-up for chronic allergic rhinitis and chronic gastroesophageal reflux with chronic laryngitis and hoarseness.  She is feeling better and her voice is improved.  She continues to complain of postnasal drainage and she did not tolerate the Astelin.  She continues to use the Flonase and the omeprazole.  Review of Systems    Objective   Physical Exam  There is nasal edema and the turbinates are pale.  There is no purulence or facial tenderness noted.  There is mild raspiness noted in her voice.  Indirect mirror laryngoscopy is limited due to a strong gag reflex and there is good vocal cord motion noted bilaterally.    Assessment/Plan   Diagnoses and all orders for this visit:  Gastroesophageal reflux disease without esophagitis (Primary)  -     omeprazole (PriLOSEC) 40 mg DR capsule; Take 1 capsule (40 mg) by mouth once daily. Do not crush or chew.  -     Referral to Gastroenterology; Future  Non-seasonal allergic rhinitis, unspecified trigger  -     fluticasone (Flonase) 50 mcg/actuation nasal spray; Administer 2 sprays into each nostril once daily. Shake gently. Before first use, prime pump. After use, clean tip and replace cap.  -     fexofenadine (Allegra) 180 mg tablet; Take 1 tablet (180 mg) by mouth once daily as needed (allergy symptoms).  Chronic laryngitis  Chronic hoarseness     1.  Chronic gastroesophageal reflux with chronic laryngitis improved with omeprazole and acid reflux precautions.  The patient was referred to gastroenterologist for further management and EGD was also ordered.  2.  Chronic allergic rhinitis improved with Flonase with some persistent allergy symptoms and intolerance of Astelin.  The patient was started on Allegra with the Flonase.  She will follow-up in 6 months.

## 2024-04-10 ENCOUNTER — HOSPITAL ENCOUNTER (OUTPATIENT)
Dept: RADIOLOGY | Facility: CLINIC | Age: 64
Discharge: HOME | End: 2024-04-10
Payer: COMMERCIAL

## 2024-04-10 DIAGNOSIS — Q45.3 ANOMALY OF PANCREAS: ICD-10-CM

## 2024-04-10 PROCEDURE — 74183 MRI ABD W/O CNTR FLWD CNTR: CPT

## 2024-04-10 PROCEDURE — A9575 INJ GADOTERATE MEGLUMI 0.1ML: HCPCS | Performed by: INTERNAL MEDICINE

## 2024-04-10 PROCEDURE — 2550000001 HC RX 255 CONTRASTS: Performed by: INTERNAL MEDICINE

## 2024-04-10 RX ORDER — GADOTERATE MEGLUMINE 376.9 MG/ML
30 INJECTION INTRAVENOUS
Status: COMPLETED | OUTPATIENT
Start: 2024-04-10 | End: 2024-04-10

## 2024-04-10 RX ADMIN — GADOTERATE MEGLUMINE 30 ML: 376.9 INJECTION INTRAVENOUS at 14:21

## 2024-04-10 ASSESSMENT — ENCOUNTER SYMPTOMS
DEPRESSION: 0
OCCASIONAL FEELINGS OF UNSTEADINESS: 0
LOSS OF SENSATION IN FEET: 0

## 2024-04-13 DIAGNOSIS — J30.89 NON-SEASONAL ALLERGIC RHINITIS, UNSPECIFIED TRIGGER: ICD-10-CM

## 2024-04-13 RX ORDER — AZELASTINE 1 MG/ML
2 SPRAY, METERED NASAL 2 TIMES DAILY
Qty: 90 ML | Refills: 0 | Status: SHIPPED | OUTPATIENT
Start: 2024-04-13 | End: 2024-05-08 | Stop reason: WASHOUT

## 2024-04-15 DIAGNOSIS — I10 BENIGN ESSENTIAL HYPERTENSION: ICD-10-CM

## 2024-04-15 DIAGNOSIS — I10 PRIMARY HYPERTENSION: ICD-10-CM

## 2024-04-17 ENCOUNTER — PHARMACY VISIT (OUTPATIENT)
Dept: PHARMACY | Facility: CLINIC | Age: 64
End: 2024-04-17
Payer: COMMERCIAL

## 2024-04-17 RX ORDER — METFORMIN HYDROCHLORIDE 1000 MG/1
1000 TABLET ORAL
Qty: 180 TABLET | Refills: 1 | Status: SHIPPED | OUTPATIENT
Start: 2024-04-17

## 2024-04-17 RX ORDER — LISINOPRIL 20 MG/1
20 TABLET ORAL DAILY
Qty: 90 TABLET | Refills: 1 | Status: SHIPPED | OUTPATIENT
Start: 2024-04-17

## 2024-04-18 PROCEDURE — RXMED WILLOW AMBULATORY MEDICATION CHARGE

## 2024-04-19 ENCOUNTER — APPOINTMENT (OUTPATIENT)
Dept: OPHTHALMOLOGY | Facility: CLINIC | Age: 64
End: 2024-04-19
Payer: COMMERCIAL

## 2024-04-19 ENCOUNTER — PHARMACY VISIT (OUTPATIENT)
Dept: PHARMACY | Facility: CLINIC | Age: 64
End: 2024-04-19
Payer: COMMERCIAL

## 2024-05-08 ENCOUNTER — OFFICE VISIT (OUTPATIENT)
Dept: SLEEP MEDICINE | Facility: CLINIC | Age: 64
End: 2024-05-08
Payer: COMMERCIAL

## 2024-05-08 VITALS
BODY MASS INDEX: 53.92 KG/M2 | DIASTOLIC BLOOD PRESSURE: 84 MMHG | HEART RATE: 83 BPM | TEMPERATURE: 97.8 F | WEIGHT: 293 LBS | HEIGHT: 62 IN | SYSTOLIC BLOOD PRESSURE: 165 MMHG

## 2024-05-08 DIAGNOSIS — G47.33 OBSTRUCTIVE SLEEP APNEA: Primary | ICD-10-CM

## 2024-05-08 DIAGNOSIS — G47.26 SHIFT WORK SLEEP DISORDER: ICD-10-CM

## 2024-05-08 PROCEDURE — 3049F LDL-C 100-129 MG/DL: CPT | Performed by: NURSE PRACTITIONER

## 2024-05-08 PROCEDURE — 3079F DIAST BP 80-89 MM HG: CPT | Performed by: NURSE PRACTITIONER

## 2024-05-08 PROCEDURE — 4010F ACE/ARB THERAPY RXD/TAKEN: CPT | Performed by: NURSE PRACTITIONER

## 2024-05-08 PROCEDURE — 3044F HG A1C LEVEL LT 7.0%: CPT | Performed by: NURSE PRACTITIONER

## 2024-05-08 PROCEDURE — 99214 OFFICE O/P EST MOD 30 MIN: CPT | Performed by: NURSE PRACTITIONER

## 2024-05-08 PROCEDURE — 1036F TOBACCO NON-USER: CPT | Performed by: NURSE PRACTITIONER

## 2024-05-08 PROCEDURE — 3077F SYST BP >= 140 MM HG: CPT | Performed by: NURSE PRACTITIONER

## 2024-05-08 ASSESSMENT — SLEEP AND FATIGUE QUESTIONNAIRES
HOW LIKELY ARE YOU TO NOD OFF OR FALL ASLEEP WHILE SITTING AND READING: HIGH CHANCE OF DOZING
HOW LIKELY ARE YOU TO NOD OFF OR FALL ASLEEP WHILE SITTING AND TALKING TO SOMEONE: WOULD NEVER DOZE
HOW LIKELY ARE YOU TO NOD OFF OR FALL ASLEEP WHILE WATCHING TV: HIGH CHANCE OF DOZING
DIFFICULTY_STAYING_ASLEEP: SEVERE
SLEEP_PROBLEM_INTERFERES_DAILY_ACTIVITIES: MUCH
SATISFACTION_WITH_CURRENT_SLEEP_PATTERN: DISSATISFIED
DIFFICULTY_FALLING_ASLEEP: MILD
WAKING_TOO_EARLY: VERY SEVERE
ESS-CHAD TOTAL SCORE: 10
SLEEP_PROBLEM_NOTICEABLE_TO_OTHERS: VERY MUCH NOTICEABLE
SITING INACTIVE IN A PUBLIC PLACE LIKE A CLASS ROOM OR A MOVIE THEATER: SLIGHT CHANCE OF DOZING
HOW LIKELY ARE YOU TO NOD OFF OR FALL ASLEEP IN A CAR, WHILE STOPPED FOR A FEW MINUTES IN TRAFFIC: SLIGHT CHANCE OF DOZING
HOW LIKELY ARE YOU TO NOD OFF OR FALL ASLEEP WHILE LYING DOWN TO REST IN THE AFTERNOON WHEN CIRCUMSTANCES PERMIT: MODERATE CHANCE OF DOZING
HOW LIKELY ARE YOU TO NOD OFF OR FALL ASLEEP WHILE SITTING QUIETLY AFTER LUNCH WITHOUT ALCOHOL: WOULD NEVER DOZE
HOW LIKELY ARE YOU TO NOD OFF OR FALL ASLEEP WHEN YOU ARE A PASSENGER IN A CAR FOR AN HOUR WITHOUT A BREAK: WOULD NEVER DOZE
WORRIED_DISTRESSED_DUE_TO_SLEEP: SOMEWHAT

## 2024-05-08 NOTE — ASSESSMENT & PLAN NOTE
diagnostic sleep study on 6/8/2018 at  [facility] which revealed mild KARLEE with an overall AHI of 12.9 and an SpO2 lorraine of 82%  Well controlled on auto PAP 6-12 EPR of 3 via [facility]  Supply Rx updated today   Continue current settings

## 2024-05-08 NOTE — PROGRESS NOTES
Patient: Cathryn Romeo    [id number]  : 1960 -- AGE 63 y.o.    Provider: ROB Prince     Location [facility]   Service Date: 2024              [facility]  Followup Visit Note      HISTORY OF PRESENT ILLNESS       HISTORY OF PRESENT ILLNESS   Cathryn Romeo is a 63 y.o. female with past medical history of KARLEE, GERD, obesity, DM, and HTN who presents to a [facility] for followup. 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     PAST SLEEP HISTORY  CATHRYN was diagnosed with KARLEE around [id number]. CATHRYN has been compliant on CPAP over the years. Her pressure was increased to 16 cm H2O in 2016 and she failed to tolerate it after 115 lbs weight loss. She lowered the pressure to 10 cm H2O herself which is more tolerable. CATHRYN now uses CPAP nightly for an average of 5 hours a night. Her CPAP vendor was [facility]. She had a repeat diagnostic sleep study on 2018 at  [facility] which revealed mild KARLEE with an overall AHI of 12.9 and an SpO2 lorraine of 82%.     CURRENT SLEEP HISTORY    On today's visit, the patient reports doing well on PAP machine. No concerns with equipment. Notes she is not sleeping well. Working a noon to midnight shift. Takes a while to wind down when she gets home. Going to sleep around 3 or 4 AM. She notes she sleeps well first part of the night then wakes up more often for second half of the night. Occasionally takes a benadryl PRN. Keeps bedroom dark, quiet, cool    RLS Followup:  denies     Insomnia follow up:  Bedtime: 3 AM  Sleep Latency: 3-4 AM  Awakenings: see above   Wake time: varies  Naps:   days off, varies     ESS: 10   KAIA: 20  FOSQ: 29    REVIEW OF SYSTEMS     REVIEW OF SYSTEMS  Review of Systems   All other systems reviewed and are negative.      ALLERGIES AND MEDICATIONS     ALLERGIES  Allergies   Allergen Reactions    Morphine Nausea Only and Other     Hypotension  and nausea    vomiting,hypotension    Codeine Other     vomiting,hypotension    Lactose GI Upset    Tramadol Itching and Rash       MEDICATIONS  Current Outpatient Medications   Medication Sig Dispense Refill    blood sugar diagnostic (OneTouch Verio test strips) strip USE TO TEST ONCE DAILY 100 strip 1    fexofenadine (Allegra) 180 mg tablet Take 1 tablet (180 mg) by mouth once daily as needed (allergy symptoms). 90 tablet 1    fluticasone (Flonase) 50 mcg/actuation nasal spray Administer 2 sprays into each nostril once daily. Shake gently. Before first use, prime pump. After use, clean tip and replace cap. 48 g 1    ibuprofen 200 mg tablet Take 3 tablets (600 mg) by mouth. PRN PAIN (most every afternoon/evening)      lisinopril 20 mg tablet TAKE 1 TABLET BY MOUTH ONCE DAILY 90 tablet 1    metFORMIN (Glucophage) 1,000 mg tablet TAKE 1 TABLET BY MOUTH TWO TIMES A DAY WITH MEALS 180 tablet 1    omeprazole (PriLOSEC) 40 mg DR capsule Take 1 capsule (40 mg) by mouth once daily. Do not crush or chew. 90 capsule 1    ondansetron ODT (Zofran-ODT) 4 mg disintegrating tablet Dissolve 1 tablet (4 mg) on the tongue every 4 hours if needed for nausea or vomiting. 20 tablet 0    ONETOUCH DELICA LANCETS MISC       pimecrolimus (Elidel) 1 % cream Use to affected area as directed once daily to twice daily 30 g 1     No current facility-administered medications for this visit.       PPAST MEDICAL HISTORY  Past Medical History:   Diagnosis Date    Disease of stomach and duodenum, unspecified 05/07/2013    Gastric and duodenal disease    Essential (primary) hypertension 01/29/2022    Hypertension    Pain in right knee 04/12/2018    Bilateral knee pain    Pain in unspecified hand     Pain in hand    Personal history of malignant neoplasm, unspecified     History of malignant neoplasm    Personal history of other diseases of the digestive system     History of gastroesophageal reflux (GERD)    Personal history of other diseases of the  "musculoskeletal system and connective tissue     History of arthritis    Personal history of other diseases of the nervous system and sense organs     History of sleep apnea    Personal history of other diseases of the nervous system and sense organs     History of carpal tunnel syndrome    Personal history of other endocrine, nutritional and metabolic disease     History of thyroid disease    Personal history of other specified conditions 05/07/2013    History of heartburn    Personal history of other specified conditions     History of shortness of breath    Type 2 diabetes mellitus without complications (Multi) 12/15/2022    Diabetes mellitus       PAST SURGICAL HISTORY:  Past Surgical History:   Procedure Laterality Date    CARPAL TUNNEL RELEASE  09/08/2014    Neuroplasty Decompression Median Nerve At Carpal Tunnel    COLONOSCOPY  09/24/2016    Complete Colonoscopy    DILATION AND CURETTAGE OF UTERUS  02/25/2015    Dilation And Curettage    HERNIA REPAIR  02/25/2015    Hernia Repair    THYROIDECTOMY, PARTIAL  07/05/2017    Thyroid Surgery Pan-Thyroidectomy    TONSILLECTOMY  02/25/2015    Tonsillectomy       FAMILY HISTORY  Family History   Problem Relation Name Age of Onset    Hypertension Mother      Lung cancer Mother      Hypertension Father      Other (suicide) Father      LARRY disease Brother      Hearing loss Other family history     Allergies Other family history        FAMILY HISTORY: No changes since previous visit. Otherwise non-contributory as charted.       SOCIAL HISTORY  She  reports that she has never smoked. She has never used smokeless tobacco. She reports that she does not currently use alcohol. No history on file for drug use.       PHYSICAL EXAM     VITAL SIGNS: /84 (BP Location: Right arm, Patient Position: Sitting, BP Cuff Size: Adult)   Pulse 83   Temp 36.6 °C (97.8 °F) (Temporal)   Ht 1.575 m (5' 2\")   Wt (!) 151 kg (333 lb 6.4 oz)   BMI 60.98 kg/m²      PREVIOUS WEIGHTS:  Wt " "Readings from Last 3 Encounters:   05/08/24 (!) 151 kg (333 lb 6.4 oz)   04/10/24 (!) 156 kg (344 lb)   01/30/24 (!) 156 kg (344 lb)       Physical Exam  Physical Exam   Constitutional: Alert and oriented, cooperative, no obvious distress   HEENT: Non icteric or anemic, EOM WNL bilaterally   Neck: Supple, no JVD, no goiter, no adenopathy, no rigidity  Chest: CTA bilaterally, no wheezing, crackles, rubs   Cardiac: RRR, S1 and S2, no murmur, rub, thrill   Abdomen: Obese, Soft, nontender, no masses, no organomegaly   Extremities: No clubbing, no LL edema   Neuromuscular: Cranial nerves grossly intact, no focal deficits      RESULTS/DATA     Bicarbonate (mmol/L)   Date Value   01/11/2024 25   12/15/2022 31   10/09/2021 27   01/25/2021 29       PAP Adherence:     employee  [employer] 10 set up 12/7/18  Mask: Flex-fit 406 nasal      ASSESSMENT/PLAN     Ms. Romeo is a 63 y.o. female and She returns in followup to the [facility] for KARLEE.    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 Has difficulty later into her shift and maintaining sleep later into the morning  Discussed light exposure and utilization of melatonin to entrain her schedule  Consistent wake and sleep time may also be helpful  Continue to monitor            Disposition    Return to clinic in 12 months       "

## 2024-05-08 NOTE — PATIENT INSTRUCTIONS
"       [facility] Sleep Medicine  DO [address]  [facility]  [address] [facility] [address]       NAME: Cathryn Romeo   DATE:  05/08/24    DIAGNOSIS:   1. Obstructive sleep apnea  Positive Airway Pressure (PAP) Therapy          Thank you for coming to the Sleep Medicine Clinic today! Your sleep medicine provider today was: Ramila Douglas, APRN-CNP Below is a summary of your treatment plan, other important information, and our contact numbers:    TREATMENT PLAN:   - Follow-up in 12 months.  - If not already done, sign up for 'My Chart' and send prescription requests or messages through this      Insomnia care:  Insomnia, or trouble falling asleep or staying asleep, can be caused by many different things such as untreated sleep apnea, anxiety, depression, stress, poor sleep habits and other medical conditions or medications. The best way to treat insomnia is to treat the cause. In general, we can all benefit from better sleep hygiene. Some recommendations to help you improve your sleep hygiene so you can fall asleep, stay asleep, and wake up felling refreshed include:    Keep a routine bedtime and rise time.  Avoid caffeine in the late afternoon and early evening, including chocolate.   Keep your bedroom technology free. Avoid TV and electronics one hour prior to bedtime. Don't have a clock visible in your room.  Set up a 'worry\" time in the late afternoon to cope with her worries and plan for the following day.  Avoid naps. If necessary, keep naps to under 15-20 minutes.  Develop a relaxing routine before bed.  Keep the bedroom for sleeping and intimacy only.  Make your bedroom dark, quiet, and comfortable temperature.  Do not exercise right before bed. Do get 20-30 minutes of exercise during your day.   Do not stay in bed for more than 30 minutes if you cannot sleep. Leave your bedroom and find a dull activity to do until you feel you could sleep. Then return to your bed. "   Don't sleep with your pet.   A light bedtime snack such as a glass of milk and banana can promote sleep.    You have been recommended to Cognitive Behavioral Therapy for Insomnia (CBT-I). CBT-I is widely recognized as an effective treatment for a wide range of insomnias. The treatment is typically made up of a number of components including assessment, behavioral and cognitive interventions, motivational techniques and relapse prevention skills. An overwhelming amount of evidence suggests that CBT-I is as effective as hypnotics and the newer non-benzodiazepine sleep aides in the acute treatment and is more effective than medication in the long term treatment of insomnia.     CBT-I at [facility] Shannan Hung, NP  GoToSleep- online course via [facility]. Self-guided. One time charge to sign up: Brooke  SleepEZ- Free self-guided course from the [facility] https://www.veterantraining.va.gov/insomnia/  Dr. Mccall - one on one CBT-I service [email or web address]       Shiftwork Sleep Disorder    Shift work disorder is a sleep disorder related with your internal body clock and it is usually related with working night shift as your sleep-wake schedule is reverse compared to most people. Here are some tips to cope with shift work and it is called shift work hygiene:     Avoid long commute from work to home.   Wear dark or orange sunglasses to block sunlight when driving back home.  May take melatonin 5 mg 90 mins to 2 hours before bedtime and/or trazodone  mg at bedtime.   Keep same bedtime and wake up schedule even on weekends. Do not have more than 90 minutes difference on day-to-day wake up time.  Eliminate noise and light from your sleep environment. May use earplugs, slumber mask, or blackout shades.   Avoid alcohol and caffeinated beverage close to bedtime.   Planned napping prior going to work and at work if feasible.  Once awake from napping, drink caffeinated beverages prior going to work and while at work to keep you awake.  Sometimes, your sleep provider may prescribe you a stimulant to take 1 hour before going to work.   Expose yourself to bright light all the time from the time you wake up and while at work.   Avoid working extended hours and do not leave the boring and tedious task toward the end of your shift when you are apt to be drowsiest (4-5 AM)  While at work, try to work with others to help keep you alert and try to be active during breaks (eg. take a walk, exercise)       Annual Reminders About Your Sleep Apnea    Your sleep apnea is well controlled based on reviewing your PAP Data Report.     General Reminders:  Continue current machine settings. Continue using machine every night. Need at least 4 hours daily usage.   Remember to clean your mask, tubings, and water chamber regularly as instructed.  Know the replacement schedule of your supplies/ accessories and contact your DME (durable medical equipment) provider if you are due for them.  Avoid driving or operating heavy machinery when drowsy. A person driving while sleepy is 5 times more likely to have an accident. If you feel sleepy, pull over and take a short power nap (sleep for less than 30 minutes). Otherwise, ask somebody to drive you.    Follow-up sooner through [employer] or calling our office if you have any of the following symptoms:  Snoring or stopping breathing while using the machine  Recurrence of fatigue, sleepiness, insomnia, and other symptoms you had prior using machine  Persistent or worsening fatigue or sleepiness despite regular use of machine  Issues tolerating the machine like bloating sensation, air hunger, too much hot air, too much pressure, taking off mask without recall in the middle of sleep, etc.     Other conservative measures to improve sleep apnea:  Losing weight can lead to some improvement of KARLEE which means you will need lower pressures in machine to control your KARLEE. In some patients, they don't need to use the machine after bariatric  surgery. Hence, consider medical and/or surgical weight loss especially if your BMI is more than 35.  Avoiding alcohol, sedative-hypnotics, and sedating medications is imperative as these substances can worsen snoring and sleep apnea  If you have nasal congestion or seasonal allergies, improving your breathing through the nose is critical for treating sleep apnea, tolerating CPAP, and improving your sleep; hence, using intranasal steroid spray like Flonase might help. Make sure you know the proper way to use it.  Stay off your back when sleeping.    Common issues with PAP machine:  Mask gets dislodged when turning to the side: Consider getting a CPAP pillow or switching to a mask with hose on top.     Dry mouth:  Your machine has built-in humidifier that heats up the air to prevent dry mouth. It can be adjusted to your comfort. You can try that first and increase setting one level one night at a time to check which setting is comfortable and effective in lessening dry mouth. If dry mouth persists despite humidity setting adjustment, may apply OTC Biotene gel over the gums at bedtime.  If Biotene gel is not effective, consider trying XEROSTOM gel from [employer].  Also, eliminate or reduce dose of meds that can cause dry mouth if possible. Lastly, may try getting a separate room humidifier machine.    Airleaks: Please call DME as they may need to adjust your mask or refit you with a different kind of mask. In addition, you can ask DME for tips on getting a good mask seal and mask fit.     Difficulty tolerating the mask: Contact your DME to try a different kind of mask and/or call office to get a referral to Sleep Psychologist for CPAP desensitization. CPAP desensitization technique is a set of strategies that helps patient cope with claustrophobia and anxiety related to wearing mask. Alternatively, we can do a daytime mini-sleep study called PAP-nap trial wherein you will try on different kinds of mask and the sleep  "technician will try different pressure settings on CPAP and BPAP machines to see which specific pressure is tolerable and comfortable for you.     Water droplets or moisture within the hose and/or mask: This is called rain-out and it is caused by condensation of too much heated humidity on the cooler walls of the hose. If you have rain-out, turn down humidity settings or get a heated hose. If you already have a heated hose, turn up the \"tube temperature\" of the heated hose. Alternatively, if you don't want to get a heated hose or warmer air, may wrap the CPAP hose with stockings to keep it somewhat warm. Also, you need to place the machine on the floor and lower the hose so that water won't travel upward towards your mask.     You can also go to the following EDUCATION WEBSITES for further information:   American Academy of Sleep Medicine http://sleepeducation.org  National Sleep Foundation: https://sleepfoundation.org  American Sleep Apnea Association: https://www.sleepapnea.org (for patients with sleep apnea)    Here at [facility], we wish you a restful sleep!     Instructions - Common KARLEE Recs: - For your sleep apnea, continue to use your PAP every night and use it whenever you are sleeping.   - Avoid alcohol or sedatives several hours prior to sleeping.   - Get additional supplies for your PAP (e.g., mask, hose, filters) every 3 months or as your insurance allows from your [employer] company. Replacement cushions for your PAP mask can be requested monthly if airseals are an issue.  - Remember to clean your mask, tubings, and water chamber regularly as instructed.  - Avoid driving or operating heavy machinery when drowsy. A person driving while sleepy is five (5) times more likely to have an accident. If you feel sleepy, pull over and take a short power nap (sleep for less than 30 minutes). Otherwise, ask somebody to drive you.    EASY WAYS TO IMPROVE YOUR SLEEP:  1. Go to bed and wake up at the same time " every day.   Aim for 8 hours but some people need less, some need more.   Get out of bed if you are not sleeping.   Limit naps to 20 min or less.   2. Expose yourself to daylight and/ or bright light in the morning.   Go outside or spend time near a window each morning.   You can use a light box (found on Amazon) if you wake before the sunrise.   Limit light exposure in the evenings (including electronic usage).   Try meditation, reading, stretching, deep breathing, warm shower or bath, or yoga nidra as part of your bedtime routine. There are many great FREE, videos or audio tracks on Routehappy/ RadioRx, etc for guidance.  3. Exercise, in some form, EVERY day, but not too close to bedtime. Consider making this part of your routine at the start of your day, followed by a cool shower.  4. Eat meals at roughly the same time every day. Make sure you are prioritizing fruits, vegetables, whole grains, lean proteins.  5. Time your caffeine intake. Make sure you are not drinking caffeine within 8 to 12 hours prior to your bedtime.   6. Avoid marijuana, alcohol, and nicotine. They will reduce sleep quality in any quantity.  7. Learn to manage anxiety. Psychology services at  can be reached at [phone] to schedule an appointment.     IMPORTANT INFORMATION:     Call 531 for medical emergencies.  Our offices are generally open from Monday-Friday, 9 am - 5 pm.  If you need to get in touch with me, you may either call me and my team(number is below) or you can use [employer].  If a referral for a test, for CPAP, or for another specialist was made, and you have not heard about scheduling this within a week, please call scheduling at [phone]).  If you are unable to make your appointment for clinic or an overnight study, kindly call the office at least 48 hours in advance to cancel and reschedule.  If you are on CPAP, please bring your device's card to each clinic appointment unless told otherwise by your  provider.  There are no supporting services by either the sleep doctors or their staff on weekends and Holidays, or after 5 PM on weekdays.   If you have been asked to come to a sleep study, make sure you bring toiletries, a comfy pillow, and any nighttime medications that you may regularly take. Also be sure to eat dinner before you arrive. We generally do not provide meals.      PRESCRIPTIONS:  We require 7 days advanced notice for prescription refills. If we do not receive the request in this time, we cannot guarantee that your medication will be refilled in time. Please contact the sleep nurses listed below for refills or request via [employer].     IMPORTANT PHONE NUMBERS:   Sleep Medicine Clinic Fax: [phone]  Appointments (for Pediatric Sleep Clinic): [phone] - option 1  Appointments (for Adult Sleep Clinic): [phone] - option 2  Appointments (For Sleep Studies): [phone] - option 3  Behavioral Sleep Medicine: [phone]  Sleep Surgery: [phone]  ENT (Otolaryngology): [phone]  Headache Clinic (Neurology): [phone]  Neurology: [phone]  Psychiatry: [phone]  Pulmonary Function Testing (PFT) Center: [phone]  Pulmonary Medicine: [phone]  [facility] (DME): [phone]  [facility] (DME): [phone]  [facility] (DME): [phone]    Our Adult Sleep Medicine Team (Please do not hesitate to call the office or sleep nurse with any questions between appointments):    Adult Sleep Nurse (Ragini Levine, DULCE):  For clinical questions and refilling prescriptions: [phone]  Email sleep diaries and other documents at: [email or web address]    Adult Sleep Medicine Secretaries:  Juana Palacio (For Linda/Frey/Krise/Gudelia/Ana/Bradley):   P: [phone]  F: [phone]  Hilda Antonio (For Reyes/Stella): P: [phone]  Fax: [phone]  Ange Suarez (For Jurcevic/Blank): P: [phone]  F:  [phone]  Cammy Storey (For Hung): P: [phone]  F: [phone]  Radha Bains (For Ritu/Anup/Ariellaary): P: [phone]  F: [phone]  Sobia Ean (For Patrice/Jeremy): P: [phone]  F: [phone]     Adult Sleep Medicine Advanced Practice Providers:  Bulmaro Bernabe ([place], [place])  Kay Hebert ([facility])  Ramila Douglas CNP ([place], [place], [place])  Misty Sue CNP ([place], [place], [place])  Shannan Persaud ([place], [place], [facility])  Maite Luna CNP ([place], [place])      Our Sleep Testing Center (STC) Locations:  Our team will contact you to schedule your sleep study, however, you can contact us as follow:  Main Phone Line (scheduling only): [phone]), option 3  Adult and Pediatric Locations  [facility] (6 years and older): [facility] - 4th floor ([address] [place] [address]) After hours line: [phone]  [facility] (Main campus: All ages): [facility], 6th floor. After hours line: [phone]   [place] (5 years and older; younger considered on case-by-case basis): [address]; [address], [address]. Scheduling  After hours line: [phone]   [place] (6 years and older): [address]; [address]; [address]   [place] (6 years and older): [address] [facility], [address]  After hours line: [phone]   [place] (13 years and older) in [place]: [address], 2nd floor  After hours line: [phone]  Critical access hospital (13 year and older): [address]  After hours line: [phone] (Home studies out of [facility])    Adult Only Locations:   [place] (18 years and older): [address] [facility] [address] [facility], 2nd floor   [place] (18 years and older): [address]; 4th floor  After hours line: [phone]  [facility] (18 years and older) at [place]: [id number] [place] [address]  After hours line: [phone]     "    CONTACTING YOUR SLEEP MEDICINE PROVIDER:  Send a message directly to your provider through \"My Chart\", which is the email service through your  Records Account: [email or web address]   Call [phone] and leave a message. One of the administrative assistants will forward the message to your sleep medicine provider through \"My Chart\" and/or email.     Your sleep medicine provider for this visit was: Ramila Douglas, VIANEY-CNP    In the event that you are running more than 15 minutes late to your appointment, I will kindly ask you to reschedule.       "

## 2024-05-08 NOTE — ASSESSMENT & PLAN NOTE
"On noon to midnight schedule for work 5 days on, 5 days off  Natural entrainment more \"typical\" schedule. Has difficulty later into her shift and maintaining sleep later into the morning  Discussed light exposure and utilization of melatonin to entrain her schedule  Consistent wake and sleep time may also be helpful  Continue to monitor  "

## 2024-06-26 ENCOUNTER — APPOINTMENT (OUTPATIENT)
Dept: GASTROENTEROLOGY | Facility: CLINIC | Age: 64
End: 2024-06-26
Payer: COMMERCIAL

## 2024-06-26 VITALS — BODY MASS INDEX: 53.92 KG/M2 | WEIGHT: 293 LBS | OXYGEN SATURATION: 96 % | HEART RATE: 79 BPM | HEIGHT: 62 IN

## 2024-06-26 DIAGNOSIS — K21.9 GASTROESOPHAGEAL REFLUX DISEASE WITHOUT ESOPHAGITIS: ICD-10-CM

## 2024-06-26 DIAGNOSIS — R09.89 THROAT CLEARING: Primary | ICD-10-CM

## 2024-06-26 DIAGNOSIS — R05.3 CHRONIC COUGH: ICD-10-CM

## 2024-06-26 PROCEDURE — 1036F TOBACCO NON-USER: CPT

## 2024-06-26 PROCEDURE — 3049F LDL-C 100-129 MG/DL: CPT

## 2024-06-26 PROCEDURE — 3044F HG A1C LEVEL LT 7.0%: CPT

## 2024-06-26 PROCEDURE — 4010F ACE/ARB THERAPY RXD/TAKEN: CPT

## 2024-06-26 PROCEDURE — 99203 OFFICE O/P NEW LOW 30 MIN: CPT

## 2024-06-26 RX ORDER — TRIAMCINOLONE ACETONIDE 55 UG/1
SPRAY, METERED NASAL
COMMUNITY
End: 2024-06-28 | Stop reason: ENTERED-IN-ERROR

## 2024-06-26 RX ORDER — BETAMETHASONE DIPROPIONATE 0.5 MG/G
LOTION TOPICAL
COMMUNITY
Start: 2024-01-12 | End: 2024-06-28 | Stop reason: ALTCHOICE

## 2024-06-26 RX ORDER — DIPHENHYDRAMINE HYDROCHLORIDE 12.5 MG/1
BAR, CHEWABLE ORAL
COMMUNITY
End: 2024-06-28 | Stop reason: ENTERED-IN-ERROR

## 2024-06-26 RX ORDER — VITAMIN B COMPLEX
CAPSULE ORAL
COMMUNITY
End: 2024-06-28 | Stop reason: ENTERED-IN-ERROR

## 2024-06-26 RX ORDER — LUTEIN 6 MG
TABLET ORAL
COMMUNITY
End: 2024-06-28 | Stop reason: ENTERED-IN-ERROR

## 2024-06-26 ASSESSMENT — ENCOUNTER SYMPTOMS
SHORTNESS OF BREATH: 0
VOMITING: 0
ABDOMINAL PAIN: 0
ABDOMINAL DISTENTION: 0
FATIGUE: 0
CONSTIPATION: 0
ANAL BLEEDING: 0
DIARRHEA: 0
CHILLS: 0
FEVER: 0
TROUBLE SWALLOWING: 0
APPETITE CHANGE: 0
BLOOD IN STOOL: 0
COUGH: 1
RECTAL PAIN: 0
NAUSEA: 0

## 2024-06-26 NOTE — PATIENT INSTRUCTIONS
I will give you samples of Voquezna to try instead of Omeprazole. Contact me to let me know how you do after at least a week.  We will either prescribe this or consider EGD  Follow up as needed

## 2024-06-26 NOTE — PROGRESS NOTES
Subjective     History of Present Illness:   Cathryn Romeo is a 63 y.o. female with PMHx of GERD, KARLEE, pulmonary fibrosis, diabetes, HTN, LENA who presents to GI clinic for further evaluation GERD    Today, patient referred here by ENT for chronic cough and throat clearing with suspected relation to GERD.  Also has overlapping PND.  States she noticed the symptoms following being diagnosed with COVID.  She has tried flonase and prilosec.  Now on Allegra but experiencing cough and throat clearing which is less when switching to Allegra.  Tried and failed nexium and dexilant, which did not work.  States Prilosec gives her partial response.  Reflux improved with weight loss, but still occurs on daily basis.  Reports taking daily ibuprofen low-dose for chronic pain.  Does not perceive her symptoms as acid reflux, but was told this was suspected by ENT.  Denies constipation, diarrhea, dyspepsia, melena, hematochezia, dysphagia, unintentional weight loss    Denies ETOH, smoking, marijuana  Denies fxh GI cancer or IBD  Abdominal Surgeries: inguinal hernia repair    Last colonoscopy reports was over 10 yrs ago- negative  1/2023 negative cologuard  Last EGD 4/2012 with Dr. Chamberlain for heartburn: Unremarkable      Past Medical History  As per HPI.     Social History  she  reports that she has never smoked. She has never used smokeless tobacco. She reports that she does not currently use alcohol.     Family History  her family history includes Allergies in an other family member; LARRY disease in her brother; Hearing loss in an other family member; Hypertension in her father and mother; Lung cancer in her mother; suicide in her father.     Review of Systems  Review of Systems   Constitutional:  Negative for appetite change, chills, fatigue and fever.   HENT:  Positive for postnasal drip. Negative for trouble swallowing.    Respiratory:  Positive for cough. Negative for shortness of breath.    Gastrointestinal:  Negative for  abdominal distention, abdominal pain, anal bleeding, blood in stool, constipation, diarrhea, nausea, rectal pain and vomiting.       Allergies  Allergies   Allergen Reactions    Morphine Nausea Only and Other     Hypotension and nausea    vomiting,hypotension    Codeine Other     vomiting,hypotension    Lactose GI Upset    Tramadol Itching and Rash       Medications  Current Outpatient Medications   Medication Instructions    b complex vitamins capsule oral    betamethasone dipropionate (Diprosone) 0.05 % lotion APPLY TOPICALLY TO THE AFFECTED AREA EVERY DAY    blood sugar diagnostic (OneTouch Verio test strips) strip USE TO TEST ONCE DAILY    diphenhydrAMINE (BENADryl) 12.5 mg chewable tablet oral    fexofenadine (ALLEGRA) 180 mg, oral, Daily PRN    fluticasone (Flonase) 50 mcg/actuation nasal spray 2 sprays, Each Nostril, Daily, Shake gently. Before first use, prime pump. After use, clean tip and replace cap.    ibuprofen 200 mg tablet 3 tablets, oral, PRN PAIN (most every afternoon/evening)    levonorgestrel (Mirena) 21 mcg/24 hr (8 yrs) 52 mg IUD intrauterine    lisinopril 20 mg tablet TAKE 1 TABLET BY MOUTH ONCE DAILY    lutein 6 mg tablet oral    metFORMIN (Glucophage) 1,000 mg tablet TAKE 1 TABLET BY MOUTH TWO TIMES A DAY WITH MEALS    omeprazole (PRILOSEC) 40 mg, oral, Daily, Do not crush or chew.    ondansetron ODT (Zofran-ODT) 4 mg disintegrating tablet Dissolve 1 tablet (4 mg) on the tongue every 4 hours if needed for nausea or vomiting.    ONETOUCH DELICA LANCETS MISC miscellaneous    pimecrolimus (Elidel) 1 % cream Use to affected area as directed once daily to twice daily    triamcinolone (Nasacort) 55 mcg nasal inhaler nasal        Objective   Visit Vitals  Pulse 79      Physical Exam  Constitutional:       Appearance: Normal appearance. She is obese.   HENT:      Mouth/Throat:      Mouth: Mucous membranes are dry.      Pharynx: Oropharynx is clear.   Cardiovascular:      Rate and Rhythm: Normal rate  and regular rhythm.   Pulmonary:      Effort: Pulmonary effort is normal.      Breath sounds: Normal breath sounds. No wheezing or rhonchi.   Abdominal:      General: Abdomen is flat. Bowel sounds are normal. There is no distension.      Palpations: Abdomen is soft. There is no hepatomegaly.      Tenderness: There is no abdominal tenderness. There is no guarding or rebound. Negative signs include Rodriguez's sign.      Hernia: No hernia is present.   Musculoskeletal:         General: Normal range of motion.   Skin:     General: Skin is warm and dry.   Neurological:      General: No focal deficit present.      Mental Status: She is alert and oriented to person, place, and time.   Psychiatric:         Mood and Affect: Mood normal.         Behavior: Behavior normal.           Lab Results   Component Value Date    WBC 8.3 01/11/2024    WBC 8.3 12/15/2022    WBC 9.8 10/09/2021    HGB 14.2 01/11/2024    HGB 13.8 12/15/2022    HGB 14.0 10/09/2021    HCT 46.7 (H) 01/11/2024    HCT 45.7 12/15/2022    HCT 44.6 10/09/2021     01/11/2024     12/15/2022     10/09/2021     Lab Results   Component Value Date     01/11/2024     12/15/2022     10/09/2021    K 4.2 01/11/2024    K 4.5 12/15/2022    K 4.1 10/09/2021     01/11/2024     12/15/2022    CL 99 10/09/2021    CO2 25 01/11/2024    CO2 31 12/15/2022    CO2 27 10/09/2021    BUN 15 01/11/2024    BUN 15 12/15/2022    BUN 15 10/09/2021    CREATININE 0.62 01/11/2024    CREATININE 0.58 12/15/2022    CREATININE 0.60 10/09/2021    CALCIUM 9.8 01/11/2024    CALCIUM 10.2 12/15/2022    CALCIUM 10.3 10/09/2021    PROT 7.5 01/11/2024    PROT 7.5 12/15/2022    PROT 7.8 10/09/2021    BILITOT 0.3 01/11/2024    BILITOT 0.3 12/15/2022    BILITOT 0.4 10/09/2021    ALKPHOS 75 01/11/2024    ALKPHOS 68 12/15/2022    ALKPHOS 77 10/09/2021    ALT 22 01/11/2024    ALT 19 12/15/2022    ALT 19 10/09/2021    AST 18 01/11/2024    AST 14 12/15/2022    AST 16  10/09/2021    GLUCOSE 140 (H) 01/11/2024    GLUCOSE 107 (H) 12/15/2022    GLUCOSE 93 10/09/2021           Cathryn Romeo is a 63 y.o. female who presents to GI clinic for chronic cough and throat clearing.    Gastroesophageal reflux disease without esophagitis  Symptoms most suggestive of chronic GERD.  Consider gastritis, esophagitis, duodenitis, PUD  -Patient given Voquezna samples to try instead of omeprazole  -Consider EGD in hospital setting  Recommended patient report results after a week of trying this medication.         Laureen Cota, APRN-CNP

## 2024-06-26 NOTE — ASSESSMENT & PLAN NOTE
Symptoms most suggestive of chronic GERD.  Consider gastritis, esophagitis, duodenitis, PUD  -Patient given Voquezna samples to try instead of omeprazole  -Consider EGD in hospital setting  Recommended patient report results after a week of trying this medication.

## 2024-06-27 PROCEDURE — RXMED WILLOW AMBULATORY MEDICATION CHARGE

## 2024-06-28 ENCOUNTER — PHARMACY VISIT (OUTPATIENT)
Dept: PHARMACY | Facility: CLINIC | Age: 64
End: 2024-06-28
Payer: COMMERCIAL

## 2024-06-28 ENCOUNTER — APPOINTMENT (OUTPATIENT)
Dept: OPHTHALMOLOGY | Facility: CLINIC | Age: 64
End: 2024-06-28
Payer: COMMERCIAL

## 2024-06-28 DIAGNOSIS — H40.003 GLAUCOMA SUSPECT OF BOTH EYES: Primary | ICD-10-CM

## 2024-06-28 PROCEDURE — 92083 EXTENDED VISUAL FIELD XM: CPT | Mod: LEFT SIDE | Performed by: OPHTHALMOLOGY

## 2024-06-28 PROCEDURE — 99212 OFFICE O/P EST SF 10 MIN: CPT | Performed by: OPHTHALMOLOGY

## 2024-06-28 ASSESSMENT — TONOMETRY
OS_IOP_MMHG: 20
OD_IOP_MMHG: 18
OS_IOP_MMHG: 25
OS_IOP_MMHG: 19
IOP_METHOD: TONOPEN
OD_IOP_MMHG: 25
IOP_METHOD: GOLDMANN APPLANATION
IOP_METHOD: GOLDMANN APPLANATION
OD_IOP_MMHG: 17

## 2024-06-28 ASSESSMENT — CONF VISUAL FIELD
OS_INFERIOR_TEMPORAL_RESTRICTION: 0
OD_INFERIOR_NASAL_RESTRICTION: 0
OS_SUPERIOR_TEMPORAL_RESTRICTION: 0
OD_INFERIOR_TEMPORAL_RESTRICTION: 0
OD_SUPERIOR_NASAL_RESTRICTION: 0
OS_SUPERIOR_NASAL_RESTRICTION: 0
OS_INFERIOR_NASAL_RESTRICTION: 0
OD_NORMAL: 1
OS_NORMAL: 1
OD_SUPERIOR_TEMPORAL_RESTRICTION: 0

## 2024-06-28 ASSESSMENT — PACHYMETRY
OS_CT(UM): 602
OD_CT(UM): 594

## 2024-06-28 ASSESSMENT — EXTERNAL EXAM - RIGHT EYE: OD_EXAM: NORMAL

## 2024-06-28 ASSESSMENT — SLIT LAMP EXAM - LIDS
COMMENTS: GOOD POSITION
COMMENTS: GOOD POSITION

## 2024-06-28 ASSESSMENT — CUP TO DISC RATIO
OD_RATIO: .3
OS_RATIO: .3

## 2024-06-28 ASSESSMENT — VISUAL ACUITY
METHOD: SNELLEN - LINEAR
OD_CC: 20/25
OS_CC: 20/25+2
CORRECTION_TYPE: GLASSES

## 2024-06-28 ASSESSMENT — ENCOUNTER SYMPTOMS: EYES NEGATIVE: 1

## 2024-06-28 ASSESSMENT — EXTERNAL EXAM - LEFT EYE: OS_EXAM: NORMAL

## 2024-06-28 NOTE — PROGRESS NOTES
Assessment/Plan   Diagnoses and all orders for this visit:  Glaucoma suspect of both eyes  -     [facility] Visual [facility] - OS - Left Eye  Borderline IOP  Normal VF    DFE and OCT nerve fiber layer (NFL) 6 mo  Start latanaprost OU

## 2024-07-15 PROCEDURE — RXMED WILLOW AMBULATORY MEDICATION CHARGE

## 2024-07-18 ENCOUNTER — PHARMACY VISIT (OUTPATIENT)
Dept: PHARMACY | Facility: CLINIC | Age: 64
End: 2024-07-18
Payer: COMMERCIAL

## 2024-07-27 PROCEDURE — RXMED WILLOW AMBULATORY MEDICATION CHARGE

## 2024-07-29 PROCEDURE — RXMED WILLOW AMBULATORY MEDICATION CHARGE

## 2024-07-30 ENCOUNTER — PHARMACY VISIT (OUTPATIENT)
Dept: PHARMACY | Facility: CLINIC | Age: 64
End: 2024-07-30
Payer: COMMERCIAL

## 2024-08-08 ENCOUNTER — APPOINTMENT (OUTPATIENT)
Dept: UROLOGY | Facility: CLINIC | Age: 64
End: 2024-08-08
Payer: COMMERCIAL

## 2024-09-10 DIAGNOSIS — J30.89 NON-SEASONAL ALLERGIC RHINITIS, UNSPECIFIED TRIGGER: ICD-10-CM

## 2024-09-10 RX ORDER — FLUTICASONE PROPIONATE 50 MCG
2 SPRAY, SUSPENSION (ML) NASAL DAILY
Qty: 48 G | Refills: 0 | Status: SHIPPED | OUTPATIENT
Start: 2024-09-10

## 2024-09-25 NOTE — PROGRESS NOTES
FOLLOW-UP VISIT       HISTORY OF PRESENT ILLNESS:   Cathryn Romeo is a 63 y.o. female who presents to me today for her annual well woman exam. She reports itching on her labia minora. She does have a history of lichen sclerosus, however she only uses clobetasol and hydrocortisone cream on occasion and does not have a maintenance routine. She also reports her urinary urgency symptoms are much worse and she is ready for medication. She notes severe dry mouth and constipation. She is currently on lisinopril for hypertension.     PAST MEDICAL HISTORY:  Past Medical History:   Diagnosis Date    Disease of stomach and duodenum, unspecified 05/07/2013    Gastric and duodenal disease    Essential (primary) hypertension 01/29/2022    Hypertension    Pain in right knee 04/12/2018    Bilateral knee pain    Pain in unspecified hand     Pain in hand    Personal history of malignant neoplasm, unspecified     History of malignant neoplasm    Personal history of other diseases of the digestive system     History of gastroesophageal reflux (GERD)    Personal history of other diseases of the musculoskeletal system and connective tissue     History of arthritis    Personal history of other diseases of the nervous system and sense organs     History of sleep apnea    Personal history of other diseases of the nervous system and sense organs     History of carpal tunnel syndrome    Personal history of other endocrine, nutritional and metabolic disease     History of thyroid disease    Personal history of other specified conditions 05/07/2013    History of heartburn    Personal history of other specified conditions     History of shortness of breath    Type 2 diabetes mellitus without complications (Multi) 12/15/2022    Diabetes mellitus       PAST SURGICAL HISTORY:  Past Surgical History:   Procedure Laterality Date    CARPAL TUNNEL RELEASE  09/08/2014    Neuroplasty Decompression Median Nerve At Carpal Tunnel    COLONOSCOPY  09/24/2016     Complete Colonoscopy    DILATION AND CURETTAGE OF UTERUS  02/25/2015    Dilation And Curettage    HERNIA REPAIR  02/25/2015    Hernia Repair    THYROIDECTOMY, PARTIAL  07/05/2017    Thyroid Surgery Pan-Thyroidectomy    TONSILLECTOMY  02/25/2015    Tonsillectomy        ALLERGIES:   Allergies   Allergen Reactions    Morphine Nausea Only and Other     Hypotension and nausea    vomiting,hypotension    Codeine Other     vomiting,hypotension    Lactose GI Upset    Tramadol Itching and Rash        MEDICATIONS:   Medication Documentation Review Audit       Reviewed by Connie Whipple MA (Medical Assistant) on 09/26/24 at 1145      Medication Order Taking? Sig Documenting Provider Last Dose Status   blood sugar diagnostic (OneTouch Verio test strips) strip [id number] No USE TO TEST ONCE DAILY Miguel Ángel Flores MD Taking Active   fexofenadine (Allegra) 180 mg tablet [id number] No Take 1 tablet (180 mg) by mouth once daily as needed (allergy symptoms). Sahil Bailey MD Taking Active   fluticasone (Flonase) 50 mcg/actuation nasal spray [id number]  Administer 2 sprays into each nostril once daily. Shake gently. Before first use, prime pump. After use, clean tip and replace cap. Sahil Bailey MD  Active   ibuprofen 200 mg tablet [id number] No Take 3 tablets (600 mg) by mouth. PRN PAIN (most every afternoon/evening) Historical Provider, MD Taking Active   lisinopril 20 mg tablet [id number] No TAKE 1 TABLET BY MOUTH ONCE DAILY Miguel Ángel Flores MD Taking Active   metFORMIN (Glucophage) 1,000 mg tablet [id number] No TAKE 1 TABLET BY MOUTH TWO TIMES A DAY WITH MEALS Miguel Ángel Flores MD Taking Active   omeprazole (PriLOSEC) 40 mg DR capsule [id number] No Take 1 capsule (40 mg) by mouth once daily. Do not crush or chew. Sahil Bailey MD Taking Active   ondansetron ODT (Zofran-ODT) 4 mg disintegrating tablet [id number] No Dissolve 1 tablet (4 mg) on the tongue every 4 hours if needed for nausea or vomiting. Miguel Ángel Flores MD Taking  Active   ONETOUCH DELICA LANCETS MISC [id number] No  Historical Provider, MD Taking Active                     SOCIAL HISTORY:  Patient  reports that she has never smoked. She has never used smokeless tobacco. She reports that she does not currently use alcohol.   Social History     Socioeconomic History    Marital status: Single     Spouse name: Not on file    Number of children: Not on file    Years of education: Not on file    Highest education level: Not on file   Occupational History    Not on file   Tobacco Use    Smoking status: Never    Smokeless tobacco: Never   Substance and Sexual Activity    Alcohol use: Not Currently    Drug use: Not on file    Sexual activity: Not on file   Other Topics Concern    Not on file   Social History Narrative    Not on file     Social Determinants of Health     Financial Resource Strain: Not on file   Food Insecurity: Not on file   Transportation Needs: Not on file   Physical Activity: Not on file   Stress: Not on file   Social Connections: Not on file   Intimate Partner Violence: Not on file   Housing Stability: Not on file       FAMILY HISTORY:  Family History   Problem Relation Name Age of Onset    Hypertension Mother      Lung cancer Mother      Hypertension Father      Other (suicide) Father      LARRY disease Brother      Hearing loss Other family history     Allergies Other family history        REVIEW OF SYSTEMS:  Constitutional: Negative for fever and chills. Denies anorexia, weight loss.  Eyes: Negative for visual disturbance.   Respiratory: Negative for shortness of breath.    Cardiovascular: Negative for chest pain.   Gastrointestinal: Negative for nausea and vomiting.   Genitourinary: See interval history above.  Skin: Negative for rash.   Neurological: Negative for dizziness and numbness.   Psychiatric/Behavioral: Negative for confusion and decreased concentration.     PHYSICAL EXAM:  Blood pressure (!) 191/97, pulse 79, temperature 36.6 °C (97.8 °F), height 1.588  "m (5' 2.5\"), weight (!) 155 kg (341 lb 9.6 oz).    :  External female genitalia is normal  There are no lesions on vulva, labia major or the labia minora   The clitoral prepuce is normal   The urethra is also normal   Speculum exam done gently and cervix visualized, no friable tissue, cysts/lesions noted   Pap smear completed    Bimanual exam done and uterus is small and adnexa are nontender and without masses appreciated   No white patches noted on vulva, there is a small inclusion cyst on the right side of her labia.     Constitutional: Patient appears well-developed and well-nourished. No distress.    Head: Normocephalic and atraumatic.    Neck: Normal range of motion.    Cardiovascular: Normal rate.    Pulmonary/Chest: Effort normal. No respiratory distress.   Musculoskeletal: Normal range of motion.    Neurological: Alert and oriented to person, place, and time.  Psychiatric: Normal mood and affect. Behavior is normal. Thought content normal.         Assessment:      1. Women's annual routine gynecological examination        2. Overactive bladder        3. Lichen sclerosus            Cathryn Romeo is a 63 y.o. female with OAB and lichen sclerosus.     We discussed with her severe constipation, dry mouth, and uncontrolled hypertension that Gemtesa would be the only recommended medication for OAB. I will send this prescription to the patient's pharmacy.     High potency topical corticosteroid ointment (clobetasol 0.05%) rub in 60-90 seconds; she will use this twice a day for the next 6 weeks followed by once a day for 6 weeks.     Plan:   Prescription for clobetasol ointment sent to the patient's pharmacy.  Prescription for Gemtesa sent to the patient's pharmacy.  Follow-up in 3 months.     Maile Castañeda MD MPH      Scribe Attestation  By signing my name below, ITraci Scribe   attest that this documentation has been prepared under the direction and in the presence of Maile Castañeda MD MPH.  "

## 2024-09-26 ENCOUNTER — APPOINTMENT (OUTPATIENT)
Dept: UROLOGY | Facility: CLINIC | Age: 64
End: 2024-09-26
Payer: COMMERCIAL

## 2024-09-26 VITALS
DIASTOLIC BLOOD PRESSURE: 97 MMHG | TEMPERATURE: 97.8 F | WEIGHT: 293 LBS | BODY MASS INDEX: 51.91 KG/M2 | HEIGHT: 63 IN | HEART RATE: 79 BPM | SYSTOLIC BLOOD PRESSURE: 191 MMHG

## 2024-09-26 DIAGNOSIS — Z01.419 PAP SMEAR, AS PART OF ROUTINE GYNECOLOGICAL EXAMINATION: ICD-10-CM

## 2024-09-26 DIAGNOSIS — N32.81 OVERACTIVE BLADDER: ICD-10-CM

## 2024-09-26 DIAGNOSIS — Z01.419 WOMEN'S ANNUAL ROUTINE GYNECOLOGICAL EXAMINATION: ICD-10-CM

## 2024-09-26 DIAGNOSIS — L90.0 LICHEN SCLEROSUS: ICD-10-CM

## 2024-09-26 PROCEDURE — 99396 PREV VISIT EST AGE 40-64: CPT | Performed by: OBSTETRICS & GYNECOLOGY

## 2024-09-26 PROCEDURE — 88175 CYTOPATH C/V AUTO FLUID REDO: CPT

## 2024-09-26 PROCEDURE — 87624 HPV HI-RISK TYP POOLED RSLT: CPT

## 2024-09-26 PROCEDURE — 3008F BODY MASS INDEX DOCD: CPT | Performed by: OBSTETRICS & GYNECOLOGY

## 2024-09-26 PROCEDURE — 3080F DIAST BP >= 90 MM HG: CPT | Performed by: OBSTETRICS & GYNECOLOGY

## 2024-09-26 PROCEDURE — 1036F TOBACCO NON-USER: CPT | Performed by: OBSTETRICS & GYNECOLOGY

## 2024-09-26 PROCEDURE — 3077F SYST BP >= 140 MM HG: CPT | Performed by: OBSTETRICS & GYNECOLOGY

## 2024-09-26 PROCEDURE — 4010F ACE/ARB THERAPY RXD/TAKEN: CPT | Performed by: OBSTETRICS & GYNECOLOGY

## 2024-09-26 PROCEDURE — 3044F HG A1C LEVEL LT 7.0%: CPT | Performed by: OBSTETRICS & GYNECOLOGY

## 2024-09-26 PROCEDURE — 3049F LDL-C 100-129 MG/DL: CPT | Performed by: OBSTETRICS & GYNECOLOGY

## 2024-09-26 RX ORDER — CLOBETASOL PROPIONATE 0.5 MG/G
OINTMENT TOPICAL
Qty: 45 G | Refills: 3 | Status: SHIPPED | OUTPATIENT
Start: 2024-09-26

## 2024-09-26 RX ORDER — VIBEGRON 75 MG/1
1 TABLET, FILM COATED ORAL DAILY
Qty: 30 TABLET | Refills: 5 | Status: SHIPPED | OUTPATIENT
Start: 2024-09-26

## 2024-09-26 ASSESSMENT — PAIN SCALES - GENERAL: PAINLEVEL: 0-NO PAIN

## 2024-09-26 NOTE — ADDENDUM NOTE
Addended by: VANESSA ZHENG on: 9/26/2024 02:36 PM     Modules accepted: Orders    
Partially impaired: cannot see medication labels or newsprint, but can see obstacles in path, and the surrounding layout; can count fingers at arm's length

## 2024-09-27 PROCEDURE — RXMED WILLOW AMBULATORY MEDICATION CHARGE

## 2024-09-29 PROCEDURE — RXMED WILLOW AMBULATORY MEDICATION CHARGE

## 2024-10-01 ENCOUNTER — PHARMACY VISIT (OUTPATIENT)
Dept: PHARMACY | Facility: CLINIC | Age: 64
End: 2024-10-01
Payer: COMMERCIAL

## 2024-10-09 DIAGNOSIS — I10 BENIGN ESSENTIAL HYPERTENSION: ICD-10-CM

## 2024-10-09 DIAGNOSIS — I10 PRIMARY HYPERTENSION: ICD-10-CM

## 2024-10-10 LAB
CYTOLOGY CMNT CVX/VAG CYTO-IMP: NORMAL
HPV HR 12 DNA GENITAL QL NAA+PROBE: NEGATIVE
HPV HR GENOTYPES PNL CVX NAA+PROBE: NEGATIVE
HPV16 DNA SPEC QL NAA+PROBE: NEGATIVE
HPV18 DNA SPEC QL NAA+PROBE: NEGATIVE
LAB AP HPV GENOTYPE QUESTION: YES
LAB AP HPV HR: NORMAL
LABORATORY COMMENT REPORT: NORMAL
PATH REPORT.TOTAL CANCER: NORMAL

## 2024-10-10 RX ORDER — METFORMIN HYDROCHLORIDE 1000 MG/1
1000 TABLET ORAL
Qty: 180 TABLET | Refills: 1 | Status: SHIPPED | OUTPATIENT
Start: 2024-10-10

## 2024-10-10 RX ORDER — LISINOPRIL 20 MG/1
20 TABLET ORAL DAILY
Qty: 90 TABLET | Refills: 1 | Status: SHIPPED | OUTPATIENT
Start: 2024-10-10

## 2024-10-11 PROCEDURE — RXMED WILLOW AMBULATORY MEDICATION CHARGE

## 2024-10-14 ENCOUNTER — PHARMACY VISIT (OUTPATIENT)
Dept: PHARMACY | Facility: CLINIC | Age: 64
End: 2024-10-14
Payer: COMMERCIAL

## 2024-10-16 ENCOUNTER — OFFICE VISIT (OUTPATIENT)
Dept: NEUROLOGY | Facility: CLINIC | Age: 64
End: 2024-10-16
Payer: COMMERCIAL

## 2024-10-16 VITALS — SYSTOLIC BLOOD PRESSURE: 138 MMHG | DIASTOLIC BLOOD PRESSURE: 98 MMHG | RESPIRATION RATE: 18 BRPM | HEART RATE: 86 BPM

## 2024-10-16 DIAGNOSIS — R25.1 TREMOR: Primary | ICD-10-CM

## 2024-10-16 PROCEDURE — 1036F TOBACCO NON-USER: CPT | Performed by: STUDENT IN AN ORGANIZED HEALTH CARE EDUCATION/TRAINING PROGRAM

## 2024-10-16 PROCEDURE — 99214 OFFICE O/P EST MOD 30 MIN: CPT | Performed by: STUDENT IN AN ORGANIZED HEALTH CARE EDUCATION/TRAINING PROGRAM

## 2024-10-16 PROCEDURE — 3080F DIAST BP >= 90 MM HG: CPT | Performed by: STUDENT IN AN ORGANIZED HEALTH CARE EDUCATION/TRAINING PROGRAM

## 2024-10-16 PROCEDURE — 3044F HG A1C LEVEL LT 7.0%: CPT | Performed by: STUDENT IN AN ORGANIZED HEALTH CARE EDUCATION/TRAINING PROGRAM

## 2024-10-16 PROCEDURE — 4010F ACE/ARB THERAPY RXD/TAKEN: CPT | Performed by: STUDENT IN AN ORGANIZED HEALTH CARE EDUCATION/TRAINING PROGRAM

## 2024-10-16 PROCEDURE — 1159F MED LIST DOCD IN RCRD: CPT | Performed by: STUDENT IN AN ORGANIZED HEALTH CARE EDUCATION/TRAINING PROGRAM

## 2024-10-16 PROCEDURE — 3075F SYST BP GE 130 - 139MM HG: CPT | Performed by: STUDENT IN AN ORGANIZED HEALTH CARE EDUCATION/TRAINING PROGRAM

## 2024-10-16 PROCEDURE — 3049F LDL-C 100-129 MG/DL: CPT | Performed by: STUDENT IN AN ORGANIZED HEALTH CARE EDUCATION/TRAINING PROGRAM

## 2024-10-16 ASSESSMENT — ENCOUNTER SYMPTOMS
LOSS OF SENSATION IN FEET: 1
OCCASIONAL FEELINGS OF UNSTEADINESS: 1
DEPRESSION: 0

## 2024-10-16 ASSESSMENT — PATIENT HEALTH QUESTIONNAIRE - PHQ9
SUM OF ALL RESPONSES TO PHQ9 QUESTIONS 1 AND 2: 0
1. LITTLE INTEREST OR PLEASURE IN DOING THINGS: NOT AT ALL
2. FEELING DOWN, DEPRESSED OR HOPELESS: NOT AT ALL

## 2024-10-16 NOTE — PROGRESS NOTES
Subjective     Cathryn Romeo is a 64 y.o. year old female here for follow up.    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  She has vascular risk factors but no vascular related issues that are neurological at this time.  She also has diabetes KARLEE on CPAP presenting for follow-up of tremor when we saw her last time at that time she has a very subtle no-no tremor which I do not see today.  She also has episodic tremor of her left hand especially when she types or very subtle tremor of the neck when she tries to focus on something however this is all on history I do not see any of that today.  They are very mild they are not disabling to her socially or ergonomically.  She is here simply for a follow-up.  No stiffness no falls no visual issues.  No other symptoms of involuntary twisting or turning of the body part.  No symptoms are consistent with parkinsonism.  No focal neurological deficits.  Review of Systems as noted in HPI.  Muscle exam very much normal    Patient Active Problem List   Diagnosis    Anal fissure    Anal pain    Astigmatism of left eye    Bilateral knee pain    Carpal tunnel syndrome    Chronic low back pain    Combined hyperlipidemia    Cotton wool spots    Familial combined hyperlipidemia    Gastroesophageal reflux disease without esophagitis    History of thyroid surgery    Hypermetropia of left eye    Hyperopia with presbyopia of both eyes    Hypertension    Hypertensive retinopathy of left eye, grade 3    Insomnia    Iron deficiency    Left thyroid nodule    Leg edema, right    Lumbar radiculopathy    Motion sickness    Morbid obesity (Multi)    Obstructive sleep apnea    Pes planus, flexible    Plantar fasciitis    Posterior subcapsular age-related cataract, right eye    Posterior tibial tendon dysfunction    Pseudophakia of right eye    Pulmonary fibrosis, unspecified (Multi)    Rash    Recurrent right inguinal hernia     Short of breath on exertion    Status post partial thyroidectomy    T2DM (type 2 diabetes mellitus) (Multi)    Throat disorder    Vitamin D deficiency    BMI 50.0-59.9, adult (Multi)    Bilateral ocular hypertension    Shift work sleep disorder    Throat clearing    Chronic cough     Past Medical History:   Diagnosis Date    Disease of stomach and duodenum, unspecified 05/07/2013    Gastric and duodenal disease    Essential (primary) hypertension 01/29/2022    Hypertension    Pain in right knee 04/12/2018    Bilateral knee pain    Pain in unspecified hand     Pain in hand    Personal history of malignant neoplasm, unspecified     History of malignant neoplasm    Personal history of other diseases of the digestive system     History of gastroesophageal reflux (GERD)    Personal history of other diseases of the musculoskeletal system and connective tissue     History of arthritis    Personal history of other diseases of the nervous system and sense organs     History of sleep apnea    Personal history of other diseases of the nervous system and sense organs     History of carpal tunnel syndrome    Personal history of other endocrine, nutritional and metabolic disease     History of thyroid disease    Personal history of other specified conditions 05/07/2013    History of heartburn    Personal history of other specified conditions     History of shortness of breath    Type 2 diabetes mellitus without complications (Multi) 12/15/2022    Diabetes mellitus     Past Surgical History:   Procedure Laterality Date    CARPAL TUNNEL RELEASE  09/08/2014    Neuroplasty Decompression Median Nerve At Carpal Tunnel    COLONOSCOPY  09/24/2016    Complete Colonoscopy    DILATION AND CURETTAGE OF UTERUS  02/25/2015    Dilation And Curettage    HERNIA REPAIR  02/25/2015    Hernia Repair    THYROIDECTOMY, PARTIAL  07/05/2017    Thyroid Surgery Pan-Thyroidectomy    TONSILLECTOMY  02/25/2015    Tonsillectomy     Social History      Tobacco Use    Smoking status: Never    Smokeless tobacco: Never   Substance Use Topics    Alcohol use: Not Currently     family history includes Allergies in an other family member; LARRY disease in her brother; Hearing loss in an other family member; Hypertension in her father and mother; Lung cancer in her mother; suicide in her father.    Current Outpatient Medications:     blood sugar diagnostic (OneTouch Verio test strips) strip, USE TO TEST ONCE DAILY, Disp: 100 strip, Rfl: 1    clobetasol (Temovate) 0.05 % ointment, Apply to vulvar area twice daily for 6 weeks followed by daily until can wean to 2-3 x/week without a flare., Disp: 45 g, Rfl: 3    fexofenadine (Allegra) 180 mg tablet, Take 1 tablet (180 mg) by mouth once daily as needed (allergy symptoms)., Disp: 90 tablet, Rfl: 1    fluticasone (Flonase) 50 mcg/actuation nasal spray, Administer 2 sprays into each nostril once daily. Shake gently. Before first use, prime pump. After use, clean tip and replace cap., Disp: 48 g, Rfl: 0    ibuprofen 200 mg tablet, Take 3 tablets (600 mg) by mouth. PRN PAIN (most every afternoon/evening), Disp: , Rfl:     lisinopril 20 mg tablet, TAKE 1 TABLET BY MOUTH ONCE DAILY, Disp: 90 tablet, Rfl: 1    metFORMIN (Glucophage) 1,000 mg tablet, TAKE 1 TABLET BY MOUTH TWO TIMES A DAY WITH MEALS, Disp: 180 tablet, Rfl: 1    omeprazole (PriLOSEC) 40 mg DR capsule, Take 1 capsule (40 mg) by mouth once daily. Do not crush or chew., Disp: 90 capsule, Rfl: 1    ondansetron ODT (Zofran-ODT) 4 mg disintegrating tablet, Dissolve 1 tablet (4 mg) on the tongue every 4 hours if needed for nausea or vomiting., Disp: 20 tablet, Rfl: 0    ONETOUCH DELICA LANCETS MISC, , Disp: , Rfl:     vibegron (Gemtesa) 75 mg tablet, Take 1 tablet (75 mg) by mouth once daily., Disp: 30 tablet, Rfl: 5  Allergies   Allergen Reactions    Morphine Nausea Only and Other     Hypotension and nausea    vomiting,hypotension    Codeine Other     vomiting,hypotension     Lactose GI Upset    Tramadol Itching and Rash       Objective   Neurological Exam  Alert oriented x 4, extraocular full, saccades normal, VOR normal, pursuit normal.  Facial sensation intact.  Face is symmetric.  I do not see any head tremor today.  No neck turning no neck posturing.  No abnormal involuntary face movement.  Power intact in all 4 extremities proximally and distally.  Sensations intact to touch in both upper and lower extremity proximally and distally.  I do not see any limb tremor or ataxia today.  No tremor on reaching kinetic or postural conditions.  Gait is unremarkable turning is unremarkable could stand up on her own from the chair.   Assessment/Plan   The patient is a 64-year-old woman who is here for a follow-up of tremor.  I do not see any tremor today during the clinical examination.  She does have tremor on history but that is episodic it does not affect her quality of life socially or ergonomically.  Altogether we do not see any need to start her on any form of pharmacotherapy at this point.  She will keep a close eye of it and keep a mental diary.  If needed she will let me know and we will start her on a pharmacotherapy at that time.  We will follow-up with her again in a year.

## 2024-10-17 ENCOUNTER — APPOINTMENT (OUTPATIENT)
Dept: OTOLARYNGOLOGY | Facility: CLINIC | Age: 64
End: 2024-10-17
Payer: COMMERCIAL

## 2024-10-21 DIAGNOSIS — J30.89 NON-SEASONAL ALLERGIC RHINITIS, UNSPECIFIED TRIGGER: ICD-10-CM

## 2024-10-21 RX ORDER — MINERAL OIL
180 ENEMA (ML) RECTAL DAILY PRN
Qty: 90 TABLET | Refills: 0 | Status: SHIPPED | OUTPATIENT
Start: 2024-10-21

## 2024-10-22 PROCEDURE — RXMED WILLOW AMBULATORY MEDICATION CHARGE

## 2024-10-23 ENCOUNTER — PHARMACY VISIT (OUTPATIENT)
Dept: PHARMACY | Facility: CLINIC | Age: 64
End: 2024-10-23
Payer: COMMERCIAL

## 2024-10-23 PROCEDURE — RXMED WILLOW AMBULATORY MEDICATION CHARGE

## 2024-10-25 ENCOUNTER — PHARMACY VISIT (OUTPATIENT)
Dept: PHARMACY | Facility: CLINIC | Age: 64
End: 2024-10-25
Payer: COMMERCIAL

## 2024-10-25 DIAGNOSIS — K21.9 GASTROESOPHAGEAL REFLUX DISEASE WITHOUT ESOPHAGITIS: ICD-10-CM

## 2024-10-26 PROCEDURE — RXMED WILLOW AMBULATORY MEDICATION CHARGE

## 2024-10-26 RX ORDER — OMEPRAZOLE 40 MG/1
40 CAPSULE, DELAYED RELEASE ORAL DAILY
Qty: 90 CAPSULE | Refills: 0 | Status: SHIPPED | OUTPATIENT
Start: 2024-10-26

## 2024-10-30 ENCOUNTER — PHARMACY VISIT (OUTPATIENT)
Dept: PHARMACY | Facility: CLINIC | Age: 64
End: 2024-10-30
Payer: COMMERCIAL

## 2024-11-05 ENCOUNTER — HOSPITAL ENCOUNTER (OUTPATIENT)
Dept: RADIOLOGY | Facility: CLINIC | Age: 64
Discharge: HOME | End: 2024-11-05
Payer: COMMERCIAL

## 2024-11-05 VITALS — HEIGHT: 63 IN | WEIGHT: 293 LBS | BODY MASS INDEX: 51.91 KG/M2

## 2024-11-05 DIAGNOSIS — Z12.31 SCREENING MAMMOGRAM FOR BREAST CANCER: ICD-10-CM

## 2024-11-05 PROCEDURE — 77067 SCR MAMMO BI INCL CAD: CPT | Performed by: RADIOLOGY

## 2024-11-05 PROCEDURE — 77063 BREAST TOMOSYNTHESIS BI: CPT | Performed by: RADIOLOGY

## 2024-11-05 PROCEDURE — 77067 SCR MAMMO BI INCL CAD: CPT

## 2024-11-13 ENCOUNTER — APPOINTMENT (OUTPATIENT)
Dept: SLEEP MEDICINE | Facility: CLINIC | Age: 64
End: 2024-11-13
Payer: COMMERCIAL

## 2024-11-14 ENCOUNTER — APPOINTMENT (OUTPATIENT)
Dept: OTOLARYNGOLOGY | Facility: CLINIC | Age: 64
End: 2024-11-14
Payer: COMMERCIAL

## 2024-11-14 VITALS — WEIGHT: 293 LBS | HEIGHT: 62 IN | BODY MASS INDEX: 53.92 KG/M2

## 2024-11-14 DIAGNOSIS — J37.0 CHRONIC LARYNGITIS: ICD-10-CM

## 2024-11-14 DIAGNOSIS — R49.0 CHRONIC HOARSENESS: ICD-10-CM

## 2024-11-14 DIAGNOSIS — K21.9 GASTROESOPHAGEAL REFLUX DISEASE WITHOUT ESOPHAGITIS: ICD-10-CM

## 2024-11-14 DIAGNOSIS — J30.89 NON-SEASONAL ALLERGIC RHINITIS, UNSPECIFIED TRIGGER: Primary | ICD-10-CM

## 2024-11-14 PROCEDURE — 1036F TOBACCO NON-USER: CPT | Performed by: OTOLARYNGOLOGY

## 2024-11-14 PROCEDURE — 1159F MED LIST DOCD IN RCRD: CPT | Performed by: OTOLARYNGOLOGY

## 2024-11-14 PROCEDURE — 3008F BODY MASS INDEX DOCD: CPT | Performed by: OTOLARYNGOLOGY

## 2024-11-14 PROCEDURE — 99214 OFFICE O/P EST MOD 30 MIN: CPT | Performed by: OTOLARYNGOLOGY

## 2024-11-14 PROCEDURE — 3044F HG A1C LEVEL LT 7.0%: CPT | Performed by: OTOLARYNGOLOGY

## 2024-11-14 PROCEDURE — RXMED WILLOW AMBULATORY MEDICATION CHARGE

## 2024-11-14 PROCEDURE — 3049F LDL-C 100-129 MG/DL: CPT | Performed by: OTOLARYNGOLOGY

## 2024-11-14 PROCEDURE — 4010F ACE/ARB THERAPY RXD/TAKEN: CPT | Performed by: OTOLARYNGOLOGY

## 2024-11-14 PROCEDURE — 1160F RVW MEDS BY RX/DR IN RCRD: CPT | Performed by: OTOLARYNGOLOGY

## 2024-11-14 RX ORDER — FLUTICASONE PROPIONATE 50 MCG
2 SPRAY, SUSPENSION (ML) NASAL DAILY
Qty: 48 G | Refills: 0 | Status: SHIPPED | OUTPATIENT
Start: 2024-11-14

## 2024-11-14 RX ORDER — MONTELUKAST SODIUM 10 MG/1
10 TABLET ORAL DAILY
Qty: 90 TABLET | Refills: 1 | Status: SHIPPED | OUTPATIENT
Start: 2024-11-14

## 2024-11-14 RX ORDER — OMEPRAZOLE 40 MG/1
40 CAPSULE, DELAYED RELEASE ORAL 2 TIMES DAILY
Qty: 180 CAPSULE | Refills: 1 | Status: SHIPPED | OUTPATIENT
Start: 2024-11-14

## 2024-11-14 RX ORDER — MINERAL OIL
180 ENEMA (ML) RECTAL DAILY PRN
Qty: 90 TABLET | Refills: 0 | Status: SHIPPED | OUTPATIENT
Start: 2024-11-14

## 2024-11-14 NOTE — PROGRESS NOTES
Subjective   Patient ID: Cathryn Romeo is a 64 y.o. female  HPI  Patient presents for follow-up for chronic allergic rhinitis and chronic gastroesophageal reflux with chronic laryngitis and hoarseness.  She is complaining of persistent allergy symptoms despite using the Flonase and Allegra.  She does not tolerate the Astelin.  She is also complaining of some persistent acid reflux symptoms despite using the omeprazole at 40 mg/day.  She tried Dexilant but she could not tolerate it.  Review of Systems    Objective   Physical Exam  There is nasal edema and the turbinates are pale.  There is no purulence or facial tenderness noted.  There is mild raspiness noted in her voice.  Indirect mirror laryngoscopy is limited due to a strong gag reflex and there is good vocal cord motion noted bilaterally.    Assessment/Plan   Diagnoses and all orders for this visit:  Non-seasonal allergic rhinitis, unspecified trigger (Primary)  -     fluticasone (Flonase) 50 mcg/actuation nasal spray; Administer 2 sprays into each nostril once daily. Shake gently. Before first use, prime pump. After use, clean tip and replace cap.  -     fexofenadine (Allegra) 180 mg tablet; Take 1 tablet (180 mg) by mouth once daily as needed (allergy symptoms).  -     montelukast (Singulair) 10 mg tablet; Take 1 tablet (10 mg) by mouth once daily.  Gastroesophageal reflux disease without esophagitis  -     omeprazole (PriLOSEC) 40 mg DR capsule; Take 1 capsule (40 mg) by mouth 2 times a day. Do not crush or chew.  Chronic laryngitis  Chronic hoarseness     1.  Chronic gastroesophageal reflux with chronic laryngitis with persistent acid reflux symptoms despite using omeprazole 40 mg/day.  She did not tolerate Dexilant.  She was started on omeprazole at 40 mg twice a day at this point.  2.  Chronic allergic rhinitis with exacerbation of the allergy symptoms despite using Flonase and Allegra.  She did not tolerate Astelin.  She was started on Singulair at this  point.  She was cautioned about the potential neuropsychiatric effects of Singulair.  Referral to an allergist will be considered if her symptoms continue.  She will follow-up in 6 months.

## 2024-11-20 PROCEDURE — RXMED WILLOW AMBULATORY MEDICATION CHARGE

## 2024-11-23 PROCEDURE — RXMED WILLOW AMBULATORY MEDICATION CHARGE

## 2024-11-26 ENCOUNTER — PHARMACY VISIT (OUTPATIENT)
Dept: PHARMACY | Facility: CLINIC | Age: 64
End: 2024-11-26
Payer: COMMERCIAL

## 2024-11-29 ENCOUNTER — PHARMACY VISIT (OUTPATIENT)
Dept: PHARMACY | Facility: CLINIC | Age: 64
End: 2024-11-29
Payer: COMMERCIAL

## 2024-12-02 DIAGNOSIS — H40.053 OCULAR HYPERTENSION, BILATERAL: Primary | ICD-10-CM

## 2024-12-02 PROCEDURE — RXMED WILLOW AMBULATORY MEDICATION CHARGE

## 2024-12-02 RX ORDER — LATANOPROST 50 UG/ML
1 SOLUTION/ DROPS OPHTHALMIC NIGHTLY
Qty: 4.5 ML | Refills: 3 | Status: SHIPPED | OUTPATIENT
Start: 2024-12-02 | End: 2025-12-02

## 2024-12-04 ENCOUNTER — PHARMACY VISIT (OUTPATIENT)
Dept: PHARMACY | Facility: CLINIC | Age: 64
End: 2024-12-04
Payer: COMMERCIAL

## 2024-12-12 ENCOUNTER — APPOINTMENT (OUTPATIENT)
Dept: OPHTHALMOLOGY | Facility: CLINIC | Age: 64
End: 2024-12-12
Payer: COMMERCIAL

## 2024-12-23 ENCOUNTER — APPOINTMENT (OUTPATIENT)
Dept: OPHTHALMOLOGY | Facility: CLINIC | Age: 64
End: 2024-12-23
Payer: COMMERCIAL

## 2024-12-23 DIAGNOSIS — H40.003 GLAUCOMA SUSPECT OF BOTH EYES: Primary | ICD-10-CM

## 2024-12-23 DIAGNOSIS — E11.9 CONTROLLED TYPE 2 DIABETES MELLITUS WITHOUT COMPLICATION, UNSPECIFIED WHETHER LONG TERM INSULIN USE (MULTI): ICD-10-CM

## 2024-12-23 DIAGNOSIS — H26.491 PCO (POSTERIOR CAPSULAR OPACIFICATION), RIGHT: ICD-10-CM

## 2024-12-23 PROCEDURE — RXMED WILLOW AMBULATORY MEDICATION CHARGE

## 2024-12-23 PROCEDURE — 99213 OFFICE O/P EST LOW 20 MIN: CPT | Performed by: OPHTHALMOLOGY

## 2024-12-23 PROCEDURE — 92133 CPTRZD OPH DX IMG PST SGM ON: CPT | Performed by: OPHTHALMOLOGY

## 2024-12-23 PROCEDURE — 66821 AFTER CATARACT LASER SURGERY: CPT | Mod: RIGHT SIDE | Performed by: OPHTHALMOLOGY

## 2024-12-23 ASSESSMENT — EXTERNAL EXAM - RIGHT EYE: OD_EXAM: NORMAL

## 2024-12-23 ASSESSMENT — EXTERNAL EXAM - LEFT EYE: OS_EXAM: NORMAL

## 2024-12-23 ASSESSMENT — REFRACTION_MANIFEST
OD_SPHERE: +0.75
OD_ADD: +2.50
OS_ADD: +2.50
OD_CYLINDER: -0.75
OD_AXIS: 095
OS_CYLINDER: -2.50
OS_SPHERE: +0.75
OS_AXIS: 095

## 2024-12-23 ASSESSMENT — REFRACTION_WEARINGRX
OS_CYLINDER: -2.25
OD_SPHERE: PLANO
OS_ADD: +2.50
OD_CYLINDER: -1.25
OS_AXIS: 095
OS_SPHERE: +0.25
OD_ADD: +2.50
SPECS_TYPE: PROG
OD_AXIS: 096

## 2024-12-23 ASSESSMENT — PACHYMETRY
OS_CT(UM): 602
OD_CT(UM): 594

## 2024-12-23 ASSESSMENT — VISUAL ACUITY
OD_CC: 20/25
OS_CC: 20/20
METHOD: SNELLEN - LINEAR
CORRECTION_TYPE: GLASSES

## 2024-12-23 ASSESSMENT — CUP TO DISC RATIO
OD_RATIO: .3
OS_RATIO: .3

## 2024-12-23 ASSESSMENT — SLIT LAMP EXAM - LIDS
COMMENTS: GOOD POSITION
COMMENTS: GOOD POSITION

## 2024-12-23 ASSESSMENT — TONOMETRY
OD_IOP_MMHG: 17
OS_IOP_MMHG: 17
IOP_METHOD: GOLDMANN APPLANATION

## 2024-12-23 NOTE — PROGRESS NOTES
Assessment/Plan   Assessment/Plan   Diagnoses and all orders for this visit:  Glaucoma suspect of both eyes  -     OCT, Optic Nerve - OU - Both Eyes  Intraocular pressure (IOP) at target pressure. Continue medications as prescribed.   PCO (posterior capsular opacification), right  -     YAG Capsulotomy - OD - Right Eye  Re check 2-3 weeks  Controlled type 2 diabetes mellitus without complication, unspecified whether long term insulin use (Multi)  no retinopathy observed on exam today od/os, pt ed to continue good BGlc, blood pressure and lipid control, rtc with any changes in vision, otherwise monitor 1 year

## 2024-12-26 ENCOUNTER — APPOINTMENT (OUTPATIENT)
Dept: UROLOGY | Facility: CLINIC | Age: 64
End: 2024-12-26
Payer: COMMERCIAL

## 2024-12-26 DIAGNOSIS — L90.0 LICHEN SCLEROSUS: ICD-10-CM

## 2024-12-26 DIAGNOSIS — N32.81 OAB (OVERACTIVE BLADDER): Primary | ICD-10-CM

## 2024-12-26 PROCEDURE — 3044F HG A1C LEVEL LT 7.0%: CPT | Performed by: OBSTETRICS & GYNECOLOGY

## 2024-12-26 PROCEDURE — 4010F ACE/ARB THERAPY RXD/TAKEN: CPT | Performed by: OBSTETRICS & GYNECOLOGY

## 2024-12-26 PROCEDURE — 99213 OFFICE O/P EST LOW 20 MIN: CPT | Performed by: OBSTETRICS & GYNECOLOGY

## 2024-12-26 PROCEDURE — 3049F LDL-C 100-129 MG/DL: CPT | Performed by: OBSTETRICS & GYNECOLOGY

## 2024-12-26 NOTE — PROGRESS NOTES
FOLLOW-UP VISIT     PROBLEM LIST:  1. OAB  2. LS       HISTORY OF PRESENT ILLNESS:   Cathryn Romeo is a 64 y.o. female who was started on Gemtessa for OAB and resumed on clobetasol tx for lichen sclerosus.   She says the gemtessa has made a big difference and she is happy with it. She has experienced some mild constipation and is using colace as needed.    The clobetasol is controlling the vulvar symptoms of LS for the most part. She is using it once a week or less.    PAST MEDICAL HISTORY:  Past Medical History:   Diagnosis Date    Disease of stomach and duodenum, unspecified 05/07/2013    Gastric and duodenal disease    Essential (primary) hypertension 01/29/2022    Hypertension    Pain in right knee 04/12/2018    Bilateral knee pain    Pain in unspecified hand     Pain in hand    Personal history of malignant neoplasm, unspecified     History of malignant neoplasm    Personal history of other diseases of the digestive system     History of gastroesophageal reflux (GERD)    Personal history of other diseases of the musculoskeletal system and connective tissue     History of arthritis    Personal history of other diseases of the nervous system and sense organs     History of sleep apnea    Personal history of other diseases of the nervous system and sense organs     History of carpal tunnel syndrome    Personal history of other endocrine, nutritional and metabolic disease     History of thyroid disease    Personal history of other specified conditions 05/07/2013    History of heartburn    Personal history of other specified conditions     History of shortness of breath    Type 2 diabetes mellitus without complications (Multi) 12/15/2022    Diabetes mellitus       PAST SURGICAL HISTORY:  Past Surgical History:   Procedure Laterality Date    CARPAL TUNNEL RELEASE  09/08/2014    Neuroplasty Decompression Median Nerve At Carpal Tunnel    CATARACT EXTRACTION W/  INTRAOCULAR LENS IMPLANT Right 10/13/2021     COLONOSCOPY  09/24/2016    Complete Colonoscopy    DILATION AND CURETTAGE OF UTERUS  02/25/2015    Dilation And Curettage    HERNIA REPAIR  02/25/2015    Hernia Repair    THYROIDECTOMY, PARTIAL  07/05/2017    Thyroid Surgery Pan-Thyroidectomy    TONSILLECTOMY  02/25/2015    Tonsillectomy        ALLERGIES:   Allergies   Allergen Reactions    Morphine Nausea Only and Other     Hypotension and nausea    vomiting,hypotension    Codeine Other     vomiting,hypotension    Lactose GI Upset    Tramadol Itching and Rash             SOCIAL HISTORY:  Patient  reports that she has never smoked. She has never used smokeless tobacco. She reports that she does not currently use alcohol.   Social History     Socioeconomic History    Marital status: Single     Spouse name: Not on file    Number of children: Not on file    Years of education: Not on file    Highest education level: Not on file   Occupational History    Not on file   Tobacco Use    Smoking status: Never    Smokeless tobacco: Never   Substance and Sexual Activity    Alcohol use: Not Currently    Drug use: Not on file    Sexual activity: Not on file   Other Topics Concern    Not on file   Social History Narrative    Not on file     Social Drivers of Health     Financial Resource Strain: Not on file   Food Insecurity: Not on file   Transportation Needs: Not on file   Physical Activity: Not on file   Stress: Not on file   Social Connections: Not on file   Intimate Partner Violence: Not on file   Housing Stability: Not on file       FAMILY HISTORY:  Family History   Problem Relation Name Age of Onset    Hypertension Mother      Lung cancer Mother      Hypertension Father      Other (suicide) Father      LARRY disease Brother      Hearing loss Other family history     Allergies Other family history        REVIEW OF SYSTEMS:  Constitutional: Negative for fever and chills. Denies anorexia, weight loss.  Eyes: Negative for visual disturbance.   Respiratory: Negative for  shortness of breath.    Cardiovascular: Negative for chest pain.   Gastrointestinal: Negative for nausea and vomiting.   Genitourinary: See interval history above.  Skin: Negative for rash.   Neurological: Negative for dizziness and numbness.   Psychiatric/Behavioral: Negative for confusion and decreased concentration.     PHYSICAL EXAM:  There were no vitals taken for this visit.  Constitutional: Patient appears well-developed and well-nourished. No distress.    Head: Normocephalic and atraumatic.    Neck: Normal range of motion.      Pulmonary/Chest: Effort normal. No respiratory distress.     Musculoskeletal: Normal range of motion.    Neurological: Alert and oriented to person, place, and time.  Psychiatric: Normal mood and affect. Behavior is normal. Thought content normal.      Virtual visit     Assessment:      Cathryn Romeo is a 64 y.o. female with OAB and LS, both improving with tx.     Plan:    Continue gemtessa  Continue weekly clobetasol and more often in case of flare  Follow up annually or sooner if needed      Maile Castañeda MD MPH

## 2024-12-31 PROCEDURE — RXMED WILLOW AMBULATORY MEDICATION CHARGE

## 2025-01-02 ENCOUNTER — PHARMACY VISIT (OUTPATIENT)
Dept: PHARMACY | Facility: CLINIC | Age: 65
End: 2025-01-02
Payer: COMMERCIAL

## 2025-01-07 PROCEDURE — RXMED WILLOW AMBULATORY MEDICATION CHARGE

## 2025-01-09 ENCOUNTER — APPOINTMENT (OUTPATIENT)
Dept: OPHTHALMOLOGY | Facility: CLINIC | Age: 65
End: 2025-01-09
Payer: COMMERCIAL

## 2025-01-13 ENCOUNTER — APPOINTMENT (OUTPATIENT)
Dept: PRIMARY CARE | Facility: CLINIC | Age: 65
End: 2025-01-13
Payer: COMMERCIAL

## 2025-01-13 ENCOUNTER — LAB (OUTPATIENT)
Dept: LAB | Facility: LAB | Age: 65
End: 2025-01-13
Payer: COMMERCIAL

## 2025-01-13 VITALS — SYSTOLIC BLOOD PRESSURE: 148 MMHG | DIASTOLIC BLOOD PRESSURE: 91 MMHG | WEIGHT: 293 LBS | BODY MASS INDEX: 62.74 KG/M2

## 2025-01-13 DIAGNOSIS — G47.33 OBSTRUCTIVE SLEEP APNEA: ICD-10-CM

## 2025-01-13 DIAGNOSIS — E66.01 MORBID OBESITY (MULTI): ICD-10-CM

## 2025-01-13 DIAGNOSIS — I10 BENIGN ESSENTIAL HYPERTENSION: ICD-10-CM

## 2025-01-13 DIAGNOSIS — E78.2 COMBINED HYPERLIPIDEMIA: ICD-10-CM

## 2025-01-13 DIAGNOSIS — E11.9 TYPE 2 DIABETES MELLITUS WITHOUT COMPLICATION, WITHOUT LONG-TERM CURRENT USE OF INSULIN (MULTI): ICD-10-CM

## 2025-01-13 DIAGNOSIS — Z00.00 HEALTH CARE MAINTENANCE: Primary | ICD-10-CM

## 2025-01-13 DIAGNOSIS — Z00.00 HEALTH CARE MAINTENANCE: ICD-10-CM

## 2025-01-13 LAB
25(OH)D3 SERPL-MCNC: 21 NG/ML (ref 30–100)
ALBUMIN SERPL BCP-MCNC: 4.2 G/DL (ref 3.4–5)
ALP SERPL-CCNC: 78 U/L (ref 33–136)
ALT SERPL W P-5'-P-CCNC: 40 U/L (ref 7–45)
ANION GAP SERPL CALC-SCNC: 14 MMOL/L (ref 10–20)
AST SERPL W P-5'-P-CCNC: 17 U/L (ref 9–39)
BILIRUB SERPL-MCNC: 0.3 MG/DL (ref 0–1.2)
BUN SERPL-MCNC: 19 MG/DL (ref 6–23)
CALCIUM SERPL-MCNC: 9.9 MG/DL (ref 8.6–10.6)
CHLORIDE SERPL-SCNC: 104 MMOL/L (ref 98–107)
CHOLEST SERPL-MCNC: 216 MG/DL (ref 0–199)
CHOLESTEROL/HDL RATIO: 4.2
CO2 SERPL-SCNC: 26 MMOL/L (ref 21–32)
CREAT SERPL-MCNC: 0.63 MG/DL (ref 0.5–1.05)
EGFRCR SERPLBLD CKD-EPI 2021: >90 ML/MIN/1.73M*2
ERYTHROCYTE [DISTWIDTH] IN BLOOD BY AUTOMATED COUNT: 13.9 % (ref 11.5–14.5)
EST. AVERAGE GLUCOSE BLD GHB EST-MCNC: 128 MG/DL
GLUCOSE SERPL-MCNC: 130 MG/DL (ref 74–99)
HBA1C MFR BLD: 6.1 %
HCT VFR BLD AUTO: 45.3 % (ref 36–46)
HDLC SERPL-MCNC: 52 MG/DL
HGB BLD-MCNC: 13.9 G/DL (ref 12–16)
LDLC SERPL CALC-MCNC: 129 MG/DL
MAGNESIUM SERPL-MCNC: 2.14 MG/DL (ref 1.6–2.4)
MCH RBC QN AUTO: 27.1 PG (ref 26–34)
MCHC RBC AUTO-ENTMCNC: 30.7 G/DL (ref 32–36)
MCV RBC AUTO: 89 FL (ref 80–100)
NON HDL CHOLESTEROL: 164 MG/DL (ref 0–149)
NRBC BLD-RTO: 0 /100 WBCS (ref 0–0)
PLATELET # BLD AUTO: 282 X10*3/UL (ref 150–450)
POTASSIUM SERPL-SCNC: 4.4 MMOL/L (ref 3.5–5.3)
PROT SERPL-MCNC: 7.3 G/DL (ref 6.4–8.2)
RBC # BLD AUTO: 5.12 X10*6/UL (ref 4–5.2)
SODIUM SERPL-SCNC: 140 MMOL/L (ref 136–145)
TRIGL SERPL-MCNC: 175 MG/DL (ref 0–149)
TSH SERPL-ACNC: 1.76 MIU/L (ref 0.44–3.98)
VLDL: 35 MG/DL (ref 0–40)
WBC # BLD AUTO: 7.6 X10*3/UL (ref 4.4–11.3)

## 2025-01-13 PROCEDURE — 93000 ELECTROCARDIOGRAM COMPLETE: CPT | Performed by: INTERNAL MEDICINE

## 2025-01-13 PROCEDURE — 3044F HG A1C LEVEL LT 7.0%: CPT | Performed by: INTERNAL MEDICINE

## 2025-01-13 PROCEDURE — 3049F LDL-C 100-129 MG/DL: CPT | Performed by: INTERNAL MEDICINE

## 2025-01-13 PROCEDURE — 83036 HEMOGLOBIN GLYCOSYLATED A1C: CPT

## 2025-01-13 PROCEDURE — 3077F SYST BP >= 140 MM HG: CPT | Performed by: INTERNAL MEDICINE

## 2025-01-13 PROCEDURE — 3080F DIAST BP >= 90 MM HG: CPT | Performed by: INTERNAL MEDICINE

## 2025-01-13 PROCEDURE — 80061 LIPID PANEL: CPT

## 2025-01-13 PROCEDURE — 80053 COMPREHEN METABOLIC PANEL: CPT

## 2025-01-13 PROCEDURE — 83735 ASSAY OF MAGNESIUM: CPT

## 2025-01-13 PROCEDURE — 99397 PER PM REEVAL EST PAT 65+ YR: CPT | Performed by: INTERNAL MEDICINE

## 2025-01-13 PROCEDURE — 82306 VITAMIN D 25 HYDROXY: CPT

## 2025-01-13 PROCEDURE — 84443 ASSAY THYROID STIM HORMONE: CPT

## 2025-01-13 PROCEDURE — 85027 COMPLETE CBC AUTOMATED: CPT

## 2025-01-13 PROCEDURE — 4010F ACE/ARB THERAPY RXD/TAKEN: CPT | Performed by: INTERNAL MEDICINE

## 2025-01-13 NOTE — PROGRESS NOTES
Subjective   Patient ID: Cathryn Romeo is a 64 y.o. female who presents for No chief complaint on file..    HPI CPE see updated front sheet no chest pain no shortness of breath still somewhat short of breath no side effect with medication bowels normal changing employ to work at [place] better daytime hours    Past medical history morbid obesity hypertension hyperlipidemia KARLEE diabetes mellitus cholelithiasis     Medications noted and unchanged    Allergies noted and unchanged    Social history no tobacco    Family history noted and unchanged    Prevention some exercise some prior blood work reviewed discussed with colonoscopy and cologuard mammogram echocardiogram     Review of Systems    Objective   There were no vitals taken for this visit.    Physical Exam vital signs noted alert and oriented x 3 NCAT PERRLA EOMI nares without discharge OP benign TM normal bilateral EAC clear bilateral no AC nodes no JVD or bruit no thyromegaly chest clear to auscultation and percussion no wheezing CV regular rate and rhythm S1-S2 without murmur gallop or rub abdomen obese soft nontender normal active bowel sounds no rebound or guarding no HSM LS spine normal curvature negative straight leg raise negative logroll negative SI joint tenderness extremities no clubbing cyanosis or edema normal distal pulses DTR 2+    Assessment/Plan impression General medical examination diabetes mellitus KARLEE hypertension hyperlipidemia morbid obesity pulmonary fibrosis? Not on CT  Plan check EKG advised on heart check A1c advised on long-term blood sugar test check magnesium vitamin D and TSH by request advised on same check comprehensive panel bedside glucose potassium and kidney function as well as liver function advised on metabolism and alternate medication changes she only takes 1 metformin per day check CBC advised on blood count follow-up for next colon screening check lipid panel advised on cholesterol profile review any other scans to see if  any follow-up is needed there sleep hygiene continue with KARLEE treatment check on home blood sugars continue with watching the breathing in cold weather and otherwise and then recheck more regularly based on above TT 60 cc 31  Patient will call to go over medication choices after blood work reviewed  RAC

## 2025-01-14 ENCOUNTER — APPOINTMENT (OUTPATIENT)
Dept: OPHTHALMOLOGY | Facility: CLINIC | Age: 65
End: 2025-01-14
Payer: COMMERCIAL

## 2025-01-14 DIAGNOSIS — Z98.41: Primary | ICD-10-CM

## 2025-01-14 DIAGNOSIS — H40.003 GLAUCOMA SUSPECT OF BOTH EYES: ICD-10-CM

## 2025-01-14 DIAGNOSIS — I10 PRIMARY HYPERTENSION: ICD-10-CM

## 2025-01-14 DIAGNOSIS — E11.9 CONTROLLED TYPE 2 DIABETES MELLITUS WITHOUT COMPLICATION, UNSPECIFIED WHETHER LONG TERM INSULIN USE (MULTI): ICD-10-CM

## 2025-01-14 DIAGNOSIS — I10 BENIGN ESSENTIAL HYPERTENSION: ICD-10-CM

## 2025-01-14 ASSESSMENT — EXTERNAL EXAM - RIGHT EYE: OD_EXAM: NORMAL

## 2025-01-14 ASSESSMENT — REFRACTION_MANIFEST
OD_ADD: +2.50
OS_SPHERE: +0.75
OS_ADD: +2.50
OD_CYLINDER: -1.50
OD_AXIS: 095
OD_SPHERE: +0.50
OS_AXIS: 090
OS_CYLINDER: -2.75

## 2025-01-14 ASSESSMENT — REFRACTION_WEARINGRX
OS_CYLINDER: -2.25
OD_SPHERE: +0.50
SPECS_TYPE: PROG
OS_AXIS: 095
OS_ADD: +2.50
OS_SPHERE: +0.50
OD_CYLINDER: -2.00
OD_AXIS: 085
OD_ADD: +2.50

## 2025-01-14 ASSESSMENT — TONOMETRY
OS_IOP_MMHG: 19
OD_IOP_MMHG: 19
IOP_METHOD: GOLDMANN APPLANATION

## 2025-01-14 ASSESSMENT — PACHYMETRY
OS_CT(UM): 602
OD_CT(UM): 594

## 2025-01-14 ASSESSMENT — EXTERNAL EXAM - LEFT EYE: OS_EXAM: NORMAL

## 2025-01-14 ASSESSMENT — VISUAL ACUITY
OD_CC+: -1
METHOD: SNELLEN - LINEAR
CORRECTION_TYPE: GLASSES
OS_CC+: -1
OD_CC: 20/20
OS_CC: 20/20

## 2025-01-14 ASSESSMENT — SLIT LAMP EXAM - LIDS
COMMENTS: GOOD POSITION
COMMENTS: GOOD POSITION

## 2025-01-14 ASSESSMENT — CUP TO DISC RATIO
OD_RATIO: .3
OS_RATIO: .3

## 2025-01-14 NOTE — PROGRESS NOTES
Assessment/Plan   Diagnoses and all orders for this visit:  History of YAG laser capsulotomy of lens, right  Vision better after YAG, Glasses prescription given to patient today   Glaucoma suspect of both eyes  Controlled type 2 diabetes mellitus without complication, unspecified whether long term insulin use (Multi)\  Did full exam and testing in  December, -Followup in 1 year or as needed for symptoms (RSVP: redness, sensitivity to light, vision loss, pain)

## 2025-01-15 ENCOUNTER — PHARMACY VISIT (OUTPATIENT)
Dept: PHARMACY | Facility: CLINIC | Age: 65
End: 2025-01-15
Payer: COMMERCIAL

## 2025-01-18 RX ORDER — LISINOPRIL 20 MG/1
20 TABLET ORAL DAILY
Qty: 90 TABLET | Refills: 1 | Status: SHIPPED | OUTPATIENT
Start: 2025-01-18

## 2025-01-18 RX ORDER — METFORMIN HYDROCHLORIDE 1000 MG/1
1000 TABLET ORAL
Qty: 180 TABLET | Refills: 1 | Status: SHIPPED | OUTPATIENT
Start: 2025-01-18

## 2025-01-20 PROCEDURE — RXMED WILLOW AMBULATORY MEDICATION CHARGE

## 2025-01-24 PROCEDURE — RXMED WILLOW AMBULATORY MEDICATION CHARGE

## 2025-01-27 ENCOUNTER — PHARMACY VISIT (OUTPATIENT)
Dept: PHARMACY | Facility: CLINIC | Age: 65
End: 2025-01-27
Payer: COMMERCIAL

## 2025-02-02 DIAGNOSIS — J30.89 NON-SEASONAL ALLERGIC RHINITIS, UNSPECIFIED TRIGGER: ICD-10-CM

## 2025-02-02 DIAGNOSIS — Z00.00 HEALTH CARE MAINTENANCE: ICD-10-CM

## 2025-02-03 PROCEDURE — RXMED WILLOW AMBULATORY MEDICATION CHARGE

## 2025-02-03 RX ORDER — ONDANSETRON 4 MG/1
4 TABLET, ORALLY DISINTEGRATING ORAL EVERY 4 HOURS PRN
Qty: 20 TABLET | Refills: 0 | Status: SHIPPED | OUTPATIENT
Start: 2025-02-03

## 2025-02-04 ENCOUNTER — PHARMACY VISIT (OUTPATIENT)
Dept: PHARMACY | Facility: CLINIC | Age: 65
End: 2025-02-04
Payer: COMMERCIAL

## 2025-02-04 PROCEDURE — RXMED WILLOW AMBULATORY MEDICATION CHARGE

## 2025-02-04 RX ORDER — FLUTICASONE PROPIONATE 50 MCG
2 SPRAY, SUSPENSION (ML) NASAL DAILY
Qty: 48 G | Refills: 0 | Status: SHIPPED | OUTPATIENT
Start: 2025-02-04

## 2025-02-05 ENCOUNTER — PHARMACY VISIT (OUTPATIENT)
Dept: PHARMACY | Facility: CLINIC | Age: 65
End: 2025-02-05
Payer: COMMERCIAL

## 2025-02-06 ENCOUNTER — PHARMACY VISIT (OUTPATIENT)
Dept: PHARMACY | Facility: CLINIC | Age: 65
End: 2025-02-06
Payer: COMMERCIAL

## 2025-02-14 PROCEDURE — RXMED WILLOW AMBULATORY MEDICATION CHARGE

## 2025-02-19 PROCEDURE — RXMED WILLOW AMBULATORY MEDICATION CHARGE

## 2025-02-24 PROCEDURE — RXMED WILLOW AMBULATORY MEDICATION CHARGE

## 2025-02-26 ENCOUNTER — PHARMACY VISIT (OUTPATIENT)
Dept: PHARMACY | Facility: CLINIC | Age: 65
End: 2025-02-26
Payer: COMMERCIAL

## 2025-02-27 ENCOUNTER — PHARMACY VISIT (OUTPATIENT)
Dept: PHARMACY | Facility: CLINIC | Age: 65
End: 2025-02-27
Payer: COMMERCIAL

## 2025-03-17 PROCEDURE — RXMED WILLOW AMBULATORY MEDICATION CHARGE

## 2025-03-19 PROCEDURE — RXMED WILLOW AMBULATORY MEDICATION CHARGE

## 2025-03-24 ENCOUNTER — APPOINTMENT (OUTPATIENT)
Dept: OPHTHALMOLOGY | Facility: CLINIC | Age: 65
End: 2025-03-24
Payer: COMMERCIAL

## 2025-03-24 ENCOUNTER — PHARMACY VISIT (OUTPATIENT)
Dept: PHARMACY | Facility: CLINIC | Age: 65
End: 2025-03-24
Payer: COMMERCIAL

## 2025-03-26 DIAGNOSIS — N32.81 OVERACTIVE BLADDER: ICD-10-CM

## 2025-03-26 PROCEDURE — RXMED WILLOW AMBULATORY MEDICATION CHARGE

## 2025-03-26 RX ORDER — VIBEGRON 75 MG/1
1 TABLET, FILM COATED ORAL DAILY
Qty: 30 TABLET | Refills: 5 | Status: SHIPPED | OUTPATIENT
Start: 2025-03-26

## 2025-03-27 ENCOUNTER — PHARMACY VISIT (OUTPATIENT)
Dept: PHARMACY | Facility: CLINIC | Age: 65
End: 2025-03-27
Payer: COMMERCIAL

## 2025-03-27 DIAGNOSIS — E55.9 VITAMIN D DEFICIENCY: Primary | ICD-10-CM

## 2025-03-27 RX ORDER — ERGOCALCIFEROL 1.25 MG/1
50000 CAPSULE ORAL WEEKLY
Qty: 12 CAPSULE | Refills: 0 | Status: SHIPPED | OUTPATIENT
Start: 2025-03-27 | End: 2025-06-27

## 2025-03-28 PROCEDURE — RXMED WILLOW AMBULATORY MEDICATION CHARGE

## 2025-04-04 ENCOUNTER — PHARMACY VISIT (OUTPATIENT)
Dept: PHARMACY | Facility: CLINIC | Age: 65
End: 2025-04-04
Payer: COMMERCIAL

## 2025-04-08 PROCEDURE — RXMED WILLOW AMBULATORY MEDICATION CHARGE

## 2025-04-10 ENCOUNTER — PHARMACY VISIT (OUTPATIENT)
Dept: PHARMACY | Facility: CLINIC | Age: 65
End: 2025-04-10
Payer: COMMERCIAL

## 2025-04-11 PROCEDURE — RXMED WILLOW AMBULATORY MEDICATION CHARGE

## 2025-04-16 ENCOUNTER — APPOINTMENT (OUTPATIENT)
Dept: PRIMARY CARE | Facility: CLINIC | Age: 65
End: 2025-04-16
Payer: COMMERCIAL

## 2025-04-16 ENCOUNTER — PHARMACY VISIT (OUTPATIENT)
Dept: PHARMACY | Facility: CLINIC | Age: 65
End: 2025-04-16
Payer: COMMERCIAL

## 2025-04-16 VITALS — WEIGHT: 293 LBS | DIASTOLIC BLOOD PRESSURE: 84 MMHG | SYSTOLIC BLOOD PRESSURE: 144 MMHG | BODY MASS INDEX: 63.65 KG/M2

## 2025-04-16 DIAGNOSIS — I10 BENIGN ESSENTIAL HYPERTENSION: ICD-10-CM

## 2025-04-16 DIAGNOSIS — E66.01 MORBID OBESITY (MULTI): ICD-10-CM

## 2025-04-16 DIAGNOSIS — E11.9 TYPE 2 DIABETES MELLITUS WITHOUT COMPLICATION, WITHOUT LONG-TERM CURRENT USE OF INSULIN: Primary | ICD-10-CM

## 2025-04-16 PROCEDURE — 3049F LDL-C 100-129 MG/DL: CPT | Performed by: INTERNAL MEDICINE

## 2025-04-16 PROCEDURE — 3077F SYST BP >= 140 MM HG: CPT | Performed by: INTERNAL MEDICINE

## 2025-04-16 PROCEDURE — 3044F HG A1C LEVEL LT 7.0%: CPT | Performed by: INTERNAL MEDICINE

## 2025-04-16 PROCEDURE — 4010F ACE/ARB THERAPY RXD/TAKEN: CPT | Performed by: INTERNAL MEDICINE

## 2025-04-16 PROCEDURE — 3079F DIAST BP 80-89 MM HG: CPT | Performed by: INTERNAL MEDICINE

## 2025-04-16 PROCEDURE — 99213 OFFICE O/P EST LOW 20 MIN: CPT | Performed by: INTERNAL MEDICINE

## 2025-04-16 RX ORDER — TIRZEPATIDE 2.5 MG/.5ML
2.5 INJECTION, SOLUTION SUBCUTANEOUS WEEKLY
Qty: 2 ML | Refills: 0 | Status: SHIPPED | OUTPATIENT
Start: 2025-04-16 | End: 2025-04-21 | Stop reason: SDUPTHER

## 2025-04-16 NOTE — PROGRESS NOTES
Subjective   Patient ID: Cathryn Romeo is a 64 y.o. female who presents for No chief complaint on file..    HPI Follow-up visit has gone through some stressful times at work but also in her personal life as her brother had relapsed and is in an IOP now  there is a family history of alcoholism she has been trying to lose weight manage her blood pressure and stressors    Review of Systems    Objective   There were no vitals taken for this visit.    Physical Exam vital signs noted alert and oriented x 3 NCAT no JVD chest clear to auscultation CV regular rate and rhythm S1-S2 without murmur gallop or rub abdomen morbidly obese soft nontender normal active bowel sounds extremities no clubbing cyanosis or edema normal distal pulses    Assessment/Plan impression hypertension morbid obesity diabetes mellitus  Plan she will check with her insurance for GLP-1 coverage and then will begin that and readvised on the blood pressure and any other blood work previously noted while continuing with current medications diet weight loss and exercise naturally and then recheck based on above    review labs (check)

## 2025-04-21 ENCOUNTER — APPOINTMENT (OUTPATIENT)
Dept: PHARMACY | Facility: HOSPITAL | Age: 65
End: 2025-04-21
Payer: COMMERCIAL

## 2025-04-21 DIAGNOSIS — E11.9 TYPE 2 DIABETES MELLITUS WITHOUT COMPLICATION, WITHOUT LONG-TERM CURRENT USE OF INSULIN: ICD-10-CM

## 2025-04-21 PROCEDURE — RXMED WILLOW AMBULATORY MEDICATION CHARGE

## 2025-04-21 RX ORDER — TIRZEPATIDE 2.5 MG/.5ML
2.5 INJECTION, SOLUTION SUBCUTANEOUS WEEKLY
Qty: 4 ML | Refills: 0 | Status: SHIPPED | OUTPATIENT
Start: 2025-04-21

## 2025-04-21 ASSESSMENT — ENCOUNTER SYMPTOMS: DIABETIC ASSOCIATED SYMPTOMS: 0

## 2025-04-21 NOTE — PROGRESS NOTES
[facility] ([facility])    Cathryn Romeo is a 64 y.o. female was referred to Clinical Pharmacy Team to complete a comprehensive medication review (CMR) with a pharmacist as part of the [employer] diabetes program.  Pharmacy team may also provide assistance in diabetes management per discussion with referring provider and/or endocrinology.    Referring Provider: Miguel Ángel Flores MD  Does patient follow with Endocrinology: No  Endocrinology Provider Name: N/A     Subjective   Allergies[1]     [facility]  [address]  [facility] [address]  Phone: [phone] Fax: [phone]    [facility]UCAS [place] - [facility] AT Portal to Registered [facility] Sites  [facility]  [place] [address]  Phone: [phone] Fax: [phone]    [id number] - [facility] [address] [place] [address]  [address] [facility] [address]  Phone: [phone] Fax: [phone]      Diabetes  She presents for her initial diabetic visit. She has type 2 diabetes mellitus. Her disease course has been stable. There are no hypoglycemic associated symptoms. There are no diabetic associated symptoms. There are no hypoglycemic complications. Risk factors for coronary artery disease include diabetes mellitus and obesity. Current diabetic treatments: Metformin 1000 mg BID. She is compliant with treatment all of the time.       Objective     There were no vitals taken for this visit.     LAB  Lab Results   Component Value Date    BILITOT 0.3 01/13/2025    CALCIUM 9.9 01/13/2025    CO2 26 01/13/2025     01/13/2025    CREATININE 0.63 01/13/2025    GLUCOSE 130 (H) 01/13/2025    ALKPHOS 78 01/13/2025    K 4.4 01/13/2025    PROT 7.3 01/13/2025     01/13/2025    AST 17 01/13/2025    ALT 40 01/13/2025    BUN 19 01/13/2025    ANIONGAP 14 01/13/2025    MG 2.14  "01/13/2025    ALBUMIN 4.2 01/13/2025    GFRF >90 12/15/2022     Lab Results   Component Value Date    TRIG 175 (H) 01/13/2025    CHOL 216 (H) 01/13/2025    LDLCALC 129 (H) 01/13/2025    HDL 52.0 01/13/2025     Lab Results   Component Value Date    HGBA1C 6.1 (H) 01/13/2025     Estimated body mass index is 63.65 kg/m² as calculated from the following:    Height as of 11/14/24: 1.575 m (5' 2\").    Weight as of 4/16/25: 158 kg (348 lb).     Current Outpatient Medications on File Prior to Visit   Medication Sig Dispense Refill    blood sugar diagnostic (OneTouch Verio test strips) strip USE TO TEST ONCE DAILY 100 strip 1    clobetasol (Temovate) 0.05 % ointment Apply to vulvar area twice daily for 6 weeks followed by daily until can wean to 2-3 x/week without a flare. 45 g 3    ergocalciferol (Vitamin D-2) 1250 mcg (50,000 units) capsule Take 1 capsule (50,000 Units) by mouth 1 (one) time per week. 12 capsule 0    fexofenadine (Allegra) 180 mg tablet Take 1 tablet (180 mg) by mouth once daily as needed (allergy symptoms). 90 tablet 0    fluticasone (Flonase) 50 mcg/actuation nasal spray Administer 2 sprays into each nostril once daily. Shake gently. Before first use, prime pump. After use, clean tip and replace cap. 48 g 0    ibuprofen 200 mg tablet Take 3 tablets (600 mg) by mouth. PRN PAIN (most every afternoon/evening)      latanoprost (Xalatan) 0.005 % ophthalmic solution Administer 1 drop into both eyes once daily at bedtime. 4.5 mL 3    lisinopril 20 mg tablet TAKE 1 TABLET BY MOUTH ONCE DAILY 90 tablet 1    metFORMIN (Glucophage) 1,000 mg tablet TAKE 1 TABLET BY MOUTH TWO TIMES A DAY WITH MEALS 180 tablet 1    montelukast (Singulair) 10 mg tablet Take 1 tablet (10 mg) by mouth once daily. 90 tablet 1    omeprazole (PriLOSEC) 40 mg DR capsule Take 1 capsule (40 mg) by mouth 2 times a day. Do not crush or chew. 180 capsule 1    ondansetron ODT (Zofran-ODT) 4 mg disintegrating tablet Dissolve 1 tablet (4 mg) on the " tongue every 4 hours if needed for nausea or vomiting. 20 tablet 0    ONETOUCH DELICA LANCETS MISC       vibegron (Gemtesa) 75 mg tablet Take 1 tablet (75 mg) by mouth once daily. 30 tablet 5    [DISCONTINUED] tirzepatide (Mounjaro) 2.5 mg/0.5 mL pen injector Inject 2.5 mg under the skin 1 (one) time per week. 2 mL 0     No current facility-administered medications on file prior to visit.          Secondary Prevention:  Statin? No  ACE-I/ARB? Yes  Aspirin? No    Pertinent PMH Review:  PMH of Pancreatitis: No  PMH of Retinopathy: No  Has patient had a yearly eye exam? No  PMH of Urinary Tract Infections: No  PMH of MTC: No    ASSESSMENT/PLAN:  - Patient enrolled in  Employee diabetes program as stated below. As a part of this program, we will initiate Mounjaro 2.5 mg once weekly once approved for program. Plan to continue 2.5 mg dose for 2 months to ensure proper tolerance, given hx of nausea, then will assess for titration. Plan to begin May 4th.    Mounjaro Education:     Counseled patient on Mounjaro MOA, expectations, side effects, duration of therapy, administration, and monitoring parameters.  Provided detailed dosing and administration counseling to ensure proper technique.   Reviewed Mounjaro titration schedule, starting with 2.5 mg once weekly to a goal of 15 mg once weekly if tolerated  Counseled patient on the benefits of GLP-1ra glycemic control and weight loss  Reviewed storage requirements of Mounjaro when not in use, and when to administer the medication if a dose is missed.  Advised patient that they may experience improved satiety after meals and portion sizes of meals may be reduced as doses of Mounjaro increase.      Employee Health Diabetes Program (VBID)  - Patient enrolled in  Employee diabetes program for $0 co-pays on diabetes medications/supplies. Enrollment should be active in 2-4 weeks.   - Refills sent to  pharmacy for diabetes medications/supplies as part of  Employee Diabetes  Program (subject to change per provider preferences).  - Requested VBID enrollment date: 4/21/25  - PharmD Management Level: 4  -  Pharmacy fill location: [place] via Mail    Assessment/Plan   Problem List Items Addressed This Visit       T2DM (type 2 diabetes mellitus) (Multi)    Continue Metformin 1000 mg BID  Start Mounjaro 2.5 mg weekly.         Relevant Medications    tirzepatide (Mounjaro) 2.5 mg/0.5 mL pen injector    Other Relevant Orders    Referral to Clinical Pharmacy          Follow up: 5/28    Continue all meds under the continuation of care with the referring provider and clinical pharmacy team.    Iron Leon, PharmD     Verbal consent to manage patient's drug therapy was obtained from [the patient and/or an individual authorized to act on behalf of a patient]. They were informed they may decline to participate or withdraw from participation in pharmacy services at any time.         [1]   Allergies  Allergen Reactions    Morphine Nausea Only and Other     Hypotension and nausea    vomiting,hypotension    Codeine Other     vomiting,hypotension    Lactose GI Upset    Tramadol Itching and Rash

## 2025-04-26 ENCOUNTER — PHARMACY VISIT (OUTPATIENT)
Dept: PHARMACY | Facility: CLINIC | Age: 65
End: 2025-04-26
Payer: COMMERCIAL

## 2025-04-28 DIAGNOSIS — J30.89 NON-SEASONAL ALLERGIC RHINITIS, UNSPECIFIED TRIGGER: ICD-10-CM

## 2025-04-28 RX ORDER — FEXOFENADINE HCL 180 MG/1
180 TABLET ORAL DAILY PRN
Qty: 90 TABLET | Refills: 0 | Status: SHIPPED | OUTPATIENT
Start: 2025-04-28

## 2025-04-30 PROCEDURE — RXMED WILLOW AMBULATORY MEDICATION CHARGE

## 2025-05-01 ENCOUNTER — PHARMACY VISIT (OUTPATIENT)
Dept: PHARMACY | Facility: CLINIC | Age: 65
End: 2025-05-01
Payer: COMMERCIAL

## 2025-05-06 PROCEDURE — RXMED WILLOW AMBULATORY MEDICATION CHARGE

## 2025-05-09 ENCOUNTER — PHARMACY VISIT (OUTPATIENT)
Dept: PHARMACY | Facility: CLINIC | Age: 65
End: 2025-05-09
Payer: COMMERCIAL

## 2025-05-14 ENCOUNTER — APPOINTMENT (OUTPATIENT)
Dept: SLEEP MEDICINE | Facility: CLINIC | Age: 65
End: 2025-05-14
Payer: COMMERCIAL

## 2025-05-14 VITALS — SYSTOLIC BLOOD PRESSURE: 160 MMHG | DIASTOLIC BLOOD PRESSURE: 80 MMHG

## 2025-05-14 DIAGNOSIS — F51.01 PRIMARY INSOMNIA: ICD-10-CM

## 2025-05-14 DIAGNOSIS — E66.01 MORBID OBESITY (MULTI): ICD-10-CM

## 2025-05-14 DIAGNOSIS — I10 PRIMARY HYPERTENSION: ICD-10-CM

## 2025-05-14 DIAGNOSIS — G47.33 OSA (OBSTRUCTIVE SLEEP APNEA): Primary | ICD-10-CM

## 2025-05-14 PROCEDURE — 1036F TOBACCO NON-USER: CPT | Performed by: NURSE PRACTITIONER

## 2025-05-14 PROCEDURE — 3079F DIAST BP 80-89 MM HG: CPT | Performed by: NURSE PRACTITIONER

## 2025-05-14 PROCEDURE — 1160F RVW MEDS BY RX/DR IN RCRD: CPT | Performed by: NURSE PRACTITIONER

## 2025-05-14 PROCEDURE — 4010F ACE/ARB THERAPY RXD/TAKEN: CPT | Performed by: NURSE PRACTITIONER

## 2025-05-14 PROCEDURE — 3049F LDL-C 100-129 MG/DL: CPT | Performed by: NURSE PRACTITIONER

## 2025-05-14 PROCEDURE — 99214 OFFICE O/P EST MOD 30 MIN: CPT | Performed by: NURSE PRACTITIONER

## 2025-05-14 PROCEDURE — 3044F HG A1C LEVEL LT 7.0%: CPT | Performed by: NURSE PRACTITIONER

## 2025-05-14 PROCEDURE — 3077F SYST BP >= 140 MM HG: CPT | Performed by: NURSE PRACTITIONER

## 2025-05-14 PROCEDURE — 1159F MED LIST DOCD IN RCRD: CPT | Performed by: NURSE PRACTITIONER

## 2025-05-14 ASSESSMENT — SLEEP AND FATIGUE QUESTIONNAIRES
ESS-CHAD TOTAL SCORE: 11
WAKING_TOO_EARLY: MODERATE
HOW LIKELY ARE YOU TO NOD OFF OR FALL ASLEEP WHILE LYING DOWN TO REST IN THE AFTERNOON WHEN CIRCUMSTANCES PERMIT: MODERATE CHANCE OF DOZING
SLEEP_PROBLEM_NOTICEABLE_TO_OTHERS: NOT AT ALL NOTICEABLE
SITING INACTIVE IN A PUBLIC PLACE LIKE A CLASS ROOM OR A MOVIE THEATER: MODERATE CHANCE OF DOZING
DIFFICULTY_FALLING_ASLEEP: MILD
SATISFACTION_WITH_CURRENT_SLEEP_PATTERN: DISSATISFIED
HOW LIKELY ARE YOU TO NOD OFF OR FALL ASLEEP IN A CAR, WHILE STOPPED FOR A FEW MINUTES IN TRAFFIC: WOULD NEVER DOZE
HOW LIKELY ARE YOU TO NOD OFF OR FALL ASLEEP WHILE SITTING QUIETLY AFTER LUNCH WITHOUT ALCOHOL: WOULD NEVER DOZE
DIFFICULTY_STAYING_ASLEEP: SEVERE
WORRIED_DISTRESSED_DUE_TO_SLEEP: SOMEWHAT
HOW LIKELY ARE YOU TO NOD OFF OR FALL ASLEEP WHILE SITTING AND TALKING TO SOMEONE: WOULD NEVER DOZE
SLEEP_PROBLEM_INTERFERES_DAILY_ACTIVITIES: SOMEWHAT
HOW LIKELY ARE YOU TO NOD OFF OR FALL ASLEEP WHILE SITTING AND READING: HIGH CHANCE OF DOZING
HOW LIKELY ARE YOU TO NOD OFF OR FALL ASLEEP WHILE WATCHING TV: MODERATE CHANCE OF DOZING
HOW LIKELY ARE YOU TO NOD OFF OR FALL ASLEEP WHEN YOU ARE A PASSENGER IN A CAR FOR AN HOUR WITHOUT A BREAK: MODERATE CHANCE OF DOZING

## 2025-05-14 NOTE — PROGRESS NOTES
Patient: Cathryn Romeo    [id number]  : 1960 -- AGE 64 y.o.    Provider: ROB Prince     Location [facility]   Service Date: 2025              [facility]  Followup Visit Note      HISTORY OF PRESENT ILLNESS       HISTORY OF PRESENT ILLNESS   Cathryn Romeo is a 64 y.o. female with past medical history of KARLEE, GERD, obesity, DM, and HTN who presents to a [facility] for followup. 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     PAST SLEEP HISTORY  CATHRYN was diagnosed with KARLEE around [id number]. CATHRYN has been compliant on CPAP over the years. Her pressure was increased to 16 cm H2O in 2016 and she failed to tolerate it after 115 lbs weight loss. She lowered the pressure to 10 cm H2O herself which is more tolerable. CATHRYN now uses CPAP nightly for an average of 5 hours a night. Her CPAP vendor was [facility]. She had a repeat diagnostic sleep study on 2018 at  Sleep Lab which revealed mild KARLEE with an overall AHI of 12.9 and an SpO2 lorraine of 82%.     CURRENT SLEEP HISTORY    On today's visit, the patient reports doing well on PAP machine. No concerns with equipment. Notes she sleeping better. She is going to bed around 11:30, falls asleep by midnight. Wakes around 3-4 AM to use the restroom, sometimes has a hard time getting back to sleep. Up for the day around 7 AM. Keeps bedroom dark, quiet, cool. Occasionally dozes off during the day if she is home and not doing much. Dozes in the evening while watching TV as well. Starting Mounjaro today, hopes to lose weight.     RLS Followup:  denies     Insomnia follow up:  Bedtime: 11:30 AM  Sleep Latency: 12 AM  Awakenings: see above   Wake time: 7 AM  Naps:   varies     ESS: 11   KAIA: 13  FOSQ: 29    REVIEW OF SYSTEMS     REVIEW OF SYSTEMS  Review of Systems   All other systems reviewed and are negative.      ALLERGIES AND MEDICATIONS      ALLERGIES  Allergies   Allergen Reactions    Morphine Nausea Only and Other     Hypotension and nausea    vomiting,hypotension    Codeine Other     vomiting,hypotension    Lactose GI Upset    Tramadol Itching and Rash       MEDICATIONS  Current Outpatient Medications   Medication Sig Dispense Refill    blood sugar diagnostic (OneTouch Verio test strips) strip USE TO TEST ONCE DAILY 100 strip 1    clobetasol (Temovate) 0.05 % ointment Apply to vulvar area twice daily for 6 weeks followed by daily until can wean to 2-3 x/week without a flare. 45 g 3    ergocalciferol (Vitamin D-2) 1250 mcg (50,000 units) capsule Take 1 capsule (50,000 Units) by mouth 1 (one) time per week. 12 capsule 0    fexofenadine (Allegra) 180 mg tablet Take 1 tablet (180 mg) by mouth once daily as needed (allergy symptoms). 90 tablet 0    fluticasone (Flonase) 50 mcg/actuation nasal spray Administer 2 sprays into each nostril once daily. Shake gently. Before first use, prime pump. After use, clean tip and replace cap. 48 g 0    ibuprofen 200 mg tablet Take 3 tablets (600 mg) by mouth. PRN PAIN (most every afternoon/evening)      latanoprost (Xalatan) 0.005 % ophthalmic solution Administer 1 drop into both eyes once daily at bedtime. 4.5 mL 3    lisinopril 20 mg tablet TAKE 1 TABLET BY MOUTH ONCE DAILY 90 tablet 1    metFORMIN (Glucophage) 1,000 mg tablet TAKE 1 TABLET BY MOUTH TWO TIMES A DAY WITH MEALS 180 tablet 1    montelukast (Singulair) 10 mg tablet Take 1 tablet (10 mg) by mouth once daily. 90 tablet 1    omeprazole (PriLOSEC) 40 mg DR capsule Take 1 capsule (40 mg) by mouth 2 times a day. Do not crush or chew. 180 capsule 1    ondansetron ODT (Zofran-ODT) 4 mg disintegrating tablet Dissolve 1 tablet (4 mg) on the tongue every 4 hours if needed for nausea or vomiting. 20 tablet 0    ONETOUCH DELICA LANCETS MISC       tirzepatide (Mounjaro) 2.5 mg/0.5 mL pen injector Inject 2.5 mg under the skin 1 (one) time per week. 4 mL 0     vibegron (Gemtesa) 75 mg tablet Take 1 tablet (75 mg) by mouth once daily. 30 tablet 5     No current facility-administered medications for this visit.       PPAST MEDICAL HISTORY  Past Medical History:   Diagnosis Date    Disease of stomach and duodenum, unspecified 05/07/2013    Gastric and duodenal disease    Essential (primary) hypertension 01/29/2022    Hypertension    Pain in right knee 04/12/2018    Bilateral knee pain    Pain in unspecified hand     Pain in hand    Personal history of malignant neoplasm, unspecified     History of malignant neoplasm    Personal history of other diseases of the digestive system     History of gastroesophageal reflux (GERD)    Personal history of other diseases of the musculoskeletal system and connective tissue     History of arthritis    Personal history of other diseases of the nervous system and sense organs     History of sleep apnea    Personal history of other diseases of the nervous system and sense organs     History of carpal tunnel syndrome    Personal history of other endocrine, nutritional and metabolic disease     History of thyroid disease    Personal history of other specified conditions 05/07/2013    History of heartburn    Personal history of other specified conditions     History of shortness of breath    Type 2 diabetes mellitus without complications 12/15/2022    Diabetes mellitus       PAST SURGICAL HISTORY:  Past Surgical History:   Procedure Laterality Date    CARPAL TUNNEL RELEASE  09/08/2014    Neuroplasty Decompression Median Nerve At Carpal Tunnel    CATARACT EXTRACTION W/  INTRAOCULAR LENS IMPLANT Right 10/13/2021    COLONOSCOPY  09/24/2016    Complete Colonoscopy    DILATION AND CURETTAGE OF UTERUS  02/25/2015    Dilation And Curettage    HERNIA REPAIR  02/25/2015    Hernia Repair    THYROIDECTOMY, PARTIAL  07/05/2017    Thyroid Surgery Pan-Thyroidectomy    TONSILLECTOMY  02/25/2015    Tonsillectomy       FAMILY HISTORY  Family History   Problem  Relation Name Age of Onset    Hypertension Mother      Lung cancer Mother      Hypertension Father      Other (suicide) Father      LARRY disease Brother      Hearing loss Other family history     Allergies Other family history        FAMILY HISTORY: No changes since previous visit. Otherwise non-contributory as charted.       SOCIAL HISTORY  She  reports that she has never smoked. She has never used smokeless tobacco. She reports that she does not currently use alcohol. No history on file for drug use.       PHYSICAL EXAM     VITAL SIGNS: /80 (BP Location: Right arm, Patient Position: Sitting, BP Cuff Size: Adult)      PREVIOUS WEIGHTS:  Wt Readings from Last 3 Encounters:   04/16/25 (!) 158 kg (348 lb)   01/13/25 (!) 156 kg (343 lb)   11/14/24 (!) 157 kg (347 lb)       Physical Exam  Physical Exam   Constitutional: Alert and oriented, cooperative, no obvious distress   HEENT: Non icteric or anemic, EOM WNL bilaterally   Neck: Supple, no JVD, no goiter, no adenopathy, no rigidity  Chest: CTA bilaterally, no wheezing, crackles, rubs   Cardiac: RRR, S1 and S2, no murmur, rub, thrill   Abdomen: Obese, Soft, nontender, no masses, no organomegaly   Extremities: No clubbing, no LL edema   Neuromuscular: Cranial nerves grossly intact, no focal deficits      RESULTS/DATA     Bicarbonate (mmol/L)   Date Value   01/13/2025 26   01/11/2024 25   12/15/2022 31   10/09/2021 27   01/25/2021 29       PAP Adherence:    Airsense 10 set up 12/7/18  Mask: Zest Q nasal petite cushion  Next supplies: Eligible now      ASSESSMENT/PLAN     Ms. Romeo is a 64 y.o. female and She returns in followup to the [facility] for KARLEE.    Problem List and Orders  Problem List Items Addressed This Visit           ICD-10-CM    Hypertension I10    BP not at goal today  Follow with PCP           Insomnia G47.00    Sleep maintenance insomnia  Likely multifactorial including daytime napping, nocturia,  rumination.  Consider CBT-I           Morbid obesity (Multi) E66.01    Weight loss recommended  Starting Mounjaro today  Follow up with PCP for further recommendations           KARLEE (obstructive sleep apnea) - Primary G47.33    Diagnostic sleep study on 6/8/2018 at  Sleep Lab which revealed mild KARLEE with an overall AHI of 12.9 and an SpO2 lorraine of 82%  Well controlled on auto PAP 6-12 EPR of 3 via [facility]  Supply Rx updated today   Continue current settings  Follow up in 12 mo          Relevant Orders    Positive Airway Pressure (PAP) Therapy         Disposition    Return to clinic in 12 months

## 2025-05-14 NOTE — ASSESSMENT & PLAN NOTE
Sleep maintenance insomnia  Likely multifactorial including daytime napping, nocturia, rumination.  Consider CBT-I

## 2025-05-14 NOTE — ASSESSMENT & PLAN NOTE
Weight loss recommended  Starting Mounjaro today  Follow up with PCP for further recommendations

## 2025-05-14 NOTE — ASSESSMENT & PLAN NOTE
Diagnostic sleep study on 6/8/2018 at  [facility] which revealed mild KARLEE with an overall AHI of 12.9 and an SpO2 lorraine of 82%  Well controlled on auto PAP 6-12 EPR of 3 via [facility]  Supply Rx updated today   Continue current settings  Follow up in 12 mo

## 2025-05-14 NOTE — PATIENT INSTRUCTIONS
[facility] Sleep [facility]  DO [address] [facility]  [address] [facility] [address]       NAME: Cathryn Romeo   DATE:  05/14/25    DIAGNOSIS:   1. KARLEE (obstructive sleep apnea)  Positive Airway Pressure (PAP) Therapy          Thank you for coming to the Sleep Medicine Clinic today! Your sleep medicine provider today was: Ramila Douglas, APRN-CNP Below is a summary of your treatment plan, other important information, and our contact numbers:    TREATMENT PLAN:   - Follow-up in 12 months.  - If not already done, sign up for 'My Chart' and send prescription requests or messages through this    Annual Reminders About Your Sleep Apnea    Your sleep apnea is well controlled based on reviewing your PAP Data Report.     General Reminders:  Continue current machine settings. Continue using machine every night. Need at least 4 hours daily usage.   Remember to clean your mask, tubings, and water chamber regularly as instructed.  Know the replacement schedule of your supplies/ accessories and contact your DME (durable medical equipment) provider if you are due for them.  Avoid driving or operating heavy machinery when drowsy. A person driving while sleepy is 5 times more likely to have an accident. If you feel sleepy, pull over and take a short power nap (sleep for less than 30 minutes). Otherwise, ask somebody to drive you.    Follow-up sooner through [facility] or calling our office if you have any of the following symptoms:  Snoring or stopping breathing while using the machine  Recurrence of fatigue, sleepiness, insomnia, and other symptoms you had prior using machine  Persistent or worsening fatigue or sleepiness despite regular use of machine  Issues tolerating the machine like bloating sensation, air hunger, too much hot air, too much pressure, taking off mask without recall in the middle of sleep, etc.     Other conservative measures to improve sleep apnea:  Losing weight  can lead to some improvement of KARLEE which means you will need lower pressures in machine to control your KARLEE. In some patients, they don't need to use the machine after bariatric surgery. Hence, consider medical and/or surgical weight loss especially if your BMI is more than 35.  Avoiding alcohol, sedative-hypnotics, and sedating medications is imperative as these substances can worsen snoring and sleep apnea  If you have nasal congestion or seasonal allergies, improving your breathing through the nose is critical for treating sleep apnea, tolerating CPAP, and improving your sleep; hence, using intranasal steroid spray like Flonase might help. Make sure you know the proper way to use it.  Stay off your back when sleeping.    Common issues with PAP machine:  Mask gets dislodged when turning to the side: Consider getting a CPAP pillow or switching to a mask with hose on top.     Dry mouth:  Your machine has built-in humidifier that heats up the air to prevent dry mouth. It can be adjusted to your comfort. You can try that first and increase setting one level one night at a time to check which setting is comfortable and effective in lessening dry mouth. If dry mouth persists despite humidity setting adjustment, may apply OTC Biotene gel over the gums at bedtime.  If Biotene gel is not effective, consider trying XEROSTOM gel from [employer].  Also, eliminate or reduce dose of meds that can cause dry mouth if possible. Lastly, may try getting a separate room humidifier machine.    Airleaks: Please call DME as they may need to adjust your mask or refit you with a different kind of mask. In addition, you can ask DME for tips on getting a good mask seal and mask fit.     Difficulty tolerating the mask: Contact your DME to try a different kind of mask and/or call office to get a referral to Sleep Psychologist for CPAP desensitization. CPAP desensitization technique is a set of strategies that helps patient cope with  "claustrophobia and anxiety related to wearing mask. Alternatively, we can do a daytime mini-sleep study called PAP-nap trial wherein you will try on different kinds of mask and the sleep technician will try different pressure settings on CPAP and BPAP machines to see which specific pressure is tolerable and comfortable for you.     Water droplets or moisture within the hose and/or mask: This is called rain-out and it is caused by condensation of too much heated humidity on the cooler walls of the hose. If you have rain-out, turn down humidity settings or get a heated hose. If you already have a heated hose, turn up the \"tube temperature\" of the heated hose. Alternatively, if you don't want to get a heated hose or warmer air, may wrap the CPAP hose with stockings to keep it somewhat warm. Also, you need to place the machine on the floor and lower the hose so that water won't travel upward towards your mask.     You can also go to the following EDUCATION WEBSITES for further information:   American Academy of Sleep Medicine http://sleepeducation.org  National Sleep Foundation: https://sleepfoundation.org  American Sleep Apnea Association: https://www.sleepapnea.org (for patients with sleep apnea)    Here at [facility], we wish you a restful sleep!     Instructions - Common KARLEE Recs: - For your sleep apnea, continue to use your PAP every night and use it whenever you are sleeping.   - Avoid alcohol or sedatives several hours prior to sleeping.   - Get additional supplies for your PAP (e.g., mask, hose, filters) every 3 months or as your insurance allows from your [employer] company. Replacement cushions for your PAP mask can be requested monthly if airseals are an issue.  - Remember to clean your mask, tubings, and water chamber regularly as instructed.  - Avoid driving or operating heavy machinery when drowsy. A person driving while sleepy is five (5) times more likely to have an accident. If you feel sleepy, pull " over and take a short power nap (sleep for less than 30 minutes). Otherwise, ask somebody to drive you.    EASY WAYS TO IMPROVE YOUR SLEEP:  1. Go to bed and wake up at the same time every day.   Aim for 8 hours but some people need less, some need more.   Get out of bed if you are not sleeping.   Limit naps to 20 min or less.   2. Expose yourself to daylight and/ or bright light in the morning.   Go outside or spend time near a window each morning.   You can use a light box (found on Amazon) if you wake before the sunrise.   Limit light exposure in the evenings (including electronic usage).   Try meditation, reading, stretching, deep breathing, warm shower or bath, or yoga nidra as part of your bedtime routine. There are many great FREE, videos or audio tracks on [employer]/ [employer], etc for guidance.  3. Exercise, in some form, EVERY day, but not too close to bedtime. Consider making this part of your routine at the start of your day, followed by a cool shower.  4. Eat meals at roughly the same time every day. Make sure you are prioritizing fruits, vegetables, whole grains, lean proteins.  5. Time your caffeine intake. Make sure you are not drinking caffeine within 8 to 12 hours prior to your bedtime.   6. Avoid marijuana, alcohol, and nicotine. They will reduce sleep quality in any quantity.  7. Learn to manage anxiety. Psychology services at  can be reached at [phone] to schedule an appointment.     IMPORTANT INFORMATION:     Call 471 for medical emergencies.  Our offices are generally open from Monday-Friday, 9 am - 5 pm.  If you need to get in touch with me, you may either call me and my team(number is below) or you can use [employer].  If a referral for a test, for CPAP, or for another specialist was made, and you have not heard about scheduling this within a week, please call scheduling at [phone] ([phone]).  If you are unable to make your appointment for clinic or an overnight study, kindly call the  office at least 48 hours in advance to cancel and reschedule.  If you are on CPAP, please bring your device's card to each clinic appointment unless told otherwise by your provider.  There are no supporting services by either the sleep doctors or their staff on weekends and Holidays, or after 5 PM on weekdays.   If you have been asked to come to a sleep study, make sure you bring toiletries, a comfy pillow, and any nighttime medications that you may regularly take. Also be sure to eat dinner before you arrive. We generally do not provide meals.      PRESCRIPTIONS:  We require 7 days advanced notice for prescription refills. If we do not receive the request in this time, we cannot guarantee that your medication will be refilled in time. Please contact the sleep nurses listed below for refills or request via [employer].     IMPORTANT PHONE NUMBERS:   Sleep Medicine Clinic Fax: [phone]  Appointments (for Pediatric Sleep Clinic): [phone] - option 1  Appointments (for Adult Sleep Clinic): [phone] - option 2  Appointments (For Sleep Studies): [phone] - option 3  Behavioral Sleep Medicine: [phone]  Sleep Surgery: [phone]  ENT (Otolaryngology): [phone]  Headache Clinic (Neurology): [phone]  Neurology: [phone]  Psychiatry: [phone]  Pulmonary Function Testing (PFT) Center: [phone]  Pulmonary Medicine: [phone]  [employer] (DME): [phone]  [employer] (DME): [phone]  [facility] ([facility]): [phone]    Our Adult Sleep Medicine Team (Please do not hesitate to call the office or sleep nurse with any questions between appointments):    Adult Sleep Nurses (Ragini Levine RN and Maile Magdaleno RN):  For clinical questions and refilling prescriptions: [phone]  Email sleep diaries and other documents at: [email or web address]    Adult Sleep Medicine Secretaries:  Juana Palacio (For  Linda/Shante/Linette/Gudelia/Ana): P: [phone]  F: [phone]  Lasha Austin (Stella)Office: [phone] option 4 Office Fax: [phone]  Hilda Antonio (For Reyes): P: [phone]  Fax: [phone]  Ange Hasdoc (For Jurcevic/Blank): P: [phone]  F: [phone]  Cammy Storey (For Hung): P: [phone]  F: [phone]  Radha Bains (For Ritu/Anup/Zakhary): P: [phone]  F: [phone]  Sobia Mckoy (For Patrice/Jeremy): P: [phone]  F: [phone]     Adult Sleep Medicine Advanced Practice Providers:  Bulmaro Bernabe ([place], [place])  Kay Hebert ([facility])  Ramila Douglas CNP ([place], [place], [place])  Misty Sue CNP ([place], [place], [facility])  Shannan Persaud ([facility], [place], [facility])  Maite Luna CNP ([facility])      Our Sleep Testing Center (STC) Locations:  Our team will contact you to schedule your sleep study, however, you can contact us as follow:  Main Phone Line (scheduling only): [phone] ([phone]), option 3  Adult and Pediatric Locations  [facility] (6 years and older): [facility] - 4th floor ([address] [place] [address]) After hours line: [phone]  [facility] (Main campus: All ages): [facility], 6th floor. After hours line: [phone]   [place] (5 years and older; younger considered on case-by-case basis): [address] [facility]; [employer] [address], [address]. Scheduling  After hours line: [phone]   [place] (6 years and older): [address]; [address]; [address]   [place] (6 years and older): [address] [facility], [address]  After hours line: [phone]   [place] (13 years and older) in [place]: [address], 2nd floor  After hours line: [phone]  [facility] (13 year and older): [address]  After hours line: [phone] (Home studies out of [facility])    Adult Only  "Locations:   [place] (18 years and older): [address] [place] [address], 2nd floor   [place] (18 years and older): [address] [facility]; 4th floor  After hours line: [phone]   [place] (18 years and older) at [place]: [address] [place] [address]  After hours line: [phone]        CONTACTING YOUR SLEEP MEDICINE PROVIDER:  Send a message directly to your provider through \"My Chart\", which is the email service through your  Records Account: [email or web address]   Call [phone] and leave a message. One of the administrative assistants will forward the message to your sleep medicine provider through \"My Chart\" and/or email.     Your sleep medicine provider for this visit was: Ramila Douglas, VIANEY-CNP    In the event that you are running more than 15 minutes late to your appointment, I will kindly ask you to reschedule.       "

## 2025-05-20 ENCOUNTER — APPOINTMENT (OUTPATIENT)
Dept: OTOLARYNGOLOGY | Facility: CLINIC | Age: 65
End: 2025-05-20
Payer: COMMERCIAL

## 2025-05-21 PROCEDURE — RXMED WILLOW AMBULATORY MEDICATION CHARGE

## 2025-05-28 ENCOUNTER — APPOINTMENT (OUTPATIENT)
Dept: PHARMACY | Facility: HOSPITAL | Age: 65
End: 2025-05-28
Payer: COMMERCIAL

## 2025-05-28 DIAGNOSIS — E11.9 TYPE 2 DIABETES MELLITUS WITHOUT COMPLICATION, WITHOUT LONG-TERM CURRENT USE OF INSULIN: ICD-10-CM

## 2025-05-28 PROCEDURE — RXMED WILLOW AMBULATORY MEDICATION CHARGE

## 2025-05-28 RX ORDER — TIRZEPATIDE 5 MG/.5ML
5 INJECTION, SOLUTION SUBCUTANEOUS WEEKLY
Qty: 2 ML | Refills: 0 | Status: SHIPPED | OUTPATIENT
Start: 2025-05-28

## 2025-05-28 ASSESSMENT — ENCOUNTER SYMPTOMS: DIABETIC ASSOCIATED SYMPTOMS: 0

## 2025-05-28 NOTE — PROGRESS NOTES
[facility] (VBID)    Cathryn Romeo is a 64 y.o. female was referred to Clinical Pharmacy Team to complete a comprehensive medication review (CMR) with a pharmacist as part of the [employer] diabetes program.  Pharmacy team may also provide assistance in diabetes management per discussion with referring provider and/or endocrinology.    Referring Provider: Miguel Ángel Flores MD  Does patient follow with Endocrinology: No  Endocrinology Provider Name: N/A     Subjective   Allergies[1]     [facility]  [address]  [facility] [address]  Phone: [phone] Fax: [phone]    [facility] - LUCAS Majano - [facility] AT Portal to Registered [facility] Sites  [facility]  [place] [address]  Phone: [phone] Fax: [phone]    [id number] [address]  [address] [facility] [address]  Phone: [phone] Fax: [phone]      Diabetes  She presents for her follow-up diabetic visit. She has type 2 diabetes mellitus. Her disease course has been stable. There are no hypoglycemic associated symptoms. There are no diabetic associated symptoms. There are no hypoglycemic complications. Risk factors for coronary artery disease include diabetes mellitus and obesity. Current diabetic treatments: Metformin 1000 mg BID, Mounjaro 2.5 mg weekly. She is compliant with treatment all of the time.       Objective     There were no vitals taken for this visit.     LAB  Lab Results   Component Value Date    BILITOT 0.3 01/13/2025    CALCIUM 9.9 01/13/2025    CO2 26 01/13/2025     01/13/2025    CREATININE 0.63 01/13/2025    GLUCOSE 130 (H) 01/13/2025    ALKPHOS 78 01/13/2025    K 4.4 01/13/2025    PROT 7.3 01/13/2025     01/13/2025    AST 17 01/13/2025    ALT 40 01/13/2025    BUN 19 01/13/2025    ANIONGAP 14  "01/13/2025    MG 2.14 01/13/2025    ALBUMIN 4.2 01/13/2025    GFRF >90 12/15/2022     Lab Results   Component Value Date    TRIG 175 (H) 01/13/2025    CHOL 216 (H) 01/13/2025    LDLCALC 129 (H) 01/13/2025    HDL 52.0 01/13/2025     Lab Results   Component Value Date    HGBA1C 6.1 (H) 01/13/2025     Estimated body mass index is 63.65 kg/m² as calculated from the following:    Height as of 11/14/24: 1.575 m (5' 2\").    Weight as of 4/16/25: 158 kg (348 lb).     Current Outpatient Medications on File Prior to Visit   Medication Sig Dispense Refill    blood sugar diagnostic (OneTouch Verio test strips) strip USE TO TEST ONCE DAILY 100 strip 1    clobetasol (Temovate) 0.05 % ointment Apply to vulvar area twice daily for 6 weeks followed by daily until can wean to 2-3 x/week without a flare. 45 g 3    ergocalciferol (Vitamin D-2) 1250 mcg (50,000 units) capsule Take 1 capsule (50,000 Units) by mouth 1 (one) time per week. 12 capsule 0    fexofenadine (Allegra) 180 mg tablet Take 1 tablet (180 mg) by mouth once daily as needed (allergy symptoms). 90 tablet 0    fluticasone (Flonase) 50 mcg/actuation nasal spray Administer 2 sprays into each nostril once daily. Shake gently. Before first use, prime pump. After use, clean tip and replace cap. 48 g 0    ibuprofen 200 mg tablet Take 3 tablets (600 mg) by mouth. PRN PAIN (most every afternoon/evening)      latanoprost (Xalatan) 0.005 % ophthalmic solution Administer 1 drop into both eyes once daily at bedtime. 4.5 mL 3    lisinopril 20 mg tablet TAKE 1 TABLET BY MOUTH ONCE DAILY 90 tablet 1    metFORMIN (Glucophage) 1,000 mg tablet TAKE 1 TABLET BY MOUTH TWO TIMES A DAY WITH MEALS 180 tablet 1    montelukast (Singulair) 10 mg tablet Take 1 tablet (10 mg) by mouth once daily. 90 tablet 1    omeprazole (PriLOSEC) 40 mg DR capsule Take 1 capsule (40 mg) by mouth 2 times a day. Do not crush or chew. 180 capsule 1    ondansetron ODT (Zofran-ODT) 4 mg disintegrating tablet Dissolve 1 " tablet (4 mg) on the tongue every 4 hours if needed for nausea or vomiting. 20 tablet 0    ONETOUCH DELICA LANCETS MISC       tirzepatide (Mounjaro) 2.5 mg/0.5 mL pen injector Inject 2.5 mg under the skin 1 (one) time per week. 4 mL 0    vibegron (Gemtesa) 75 mg tablet Take 1 tablet (75 mg) by mouth once daily. 30 tablet 5     No current facility-administered medications on file prior to visit.      Secondary Prevention:  Statin? No  ACE-I/ARB? Yes  Aspirin? No    Pertinent PMH Review:  PMH of Pancreatitis: No  PMH of Retinopathy: No  Has patient had a yearly eye exam? No  PMH of Urinary Tract Infections: No  PMH of MTC: No    ASSESSMENT/PLAN:  - Since last visit, patient has initiated Mounjaro 2.5 mg weekly and has taken two doses with very minimal GI upset. Given good tolerance, we will increase Mounjaro to 5 mg after 3rd dose (start date 6/8). Plan to follow up every 3 weeks for dose titration.     Employee Health Diabetes Program (VBID)  - Patient enrolled in  Employee diabetes program for $0 co-pays on diabetes medications/supplies. Enrollment should be active in 2-4 weeks.   - Refills sent to  pharmacy for diabetes medications/supplies as part of  Employee Diabetes Program (subject to change per provider preferences).  - Requested VBID enrollment date: 4/21/25  - PharmD Management Level: 4  -  Pharmacy fill location: [place] via Mail    Assessment/Plan   Problem List Items Addressed This Visit       T2DM (type 2 diabetes mellitus) (Multi)    Continue Metformin 1000 mg BID  Increase to Mounjaro 5 mg weekly.               Follow up: 6/25    Continue all meds under the continuation of care with the referring provider and clinical pharmacy team.    Iron Leon, PharmD     Verbal consent to manage patient's drug therapy was obtained from [the patient and/or an individual authorized to act on behalf of a patient]. They were informed they may decline to participate or withdraw from participation in pharmacy  services at any time.         [1]   Allergies  Allergen Reactions    Morphine Nausea Only and Other     Hypotension and nausea    vomiting,hypotension    Codeine Other     vomiting,hypotension    Lactose GI Upset    Tramadol Itching and Rash

## 2025-06-01 DIAGNOSIS — E55.9 VITAMIN D DEFICIENCY: ICD-10-CM

## 2025-06-01 DIAGNOSIS — J30.89 NON-SEASONAL ALLERGIC RHINITIS, UNSPECIFIED TRIGGER: ICD-10-CM

## 2025-06-02 DIAGNOSIS — H40.053 OCULAR HYPERTENSION, BILATERAL: ICD-10-CM

## 2025-06-02 PROCEDURE — RXMED WILLOW AMBULATORY MEDICATION CHARGE

## 2025-06-02 RX ORDER — ERGOCALCIFEROL 1.25 MG/1
50000 CAPSULE ORAL WEEKLY
Qty: 12 CAPSULE | Refills: 0 | Status: SHIPPED | OUTPATIENT
Start: 2025-06-02 | End: 2025-08-29

## 2025-06-02 RX ORDER — MONTELUKAST SODIUM 10 MG/1
10 TABLET ORAL DAILY
Qty: 90 TABLET | Refills: 1 | Status: SHIPPED | OUTPATIENT
Start: 2025-06-02

## 2025-06-02 RX ORDER — LATANOPROST 50 UG/ML
1 SOLUTION/ DROPS OPHTHALMIC NIGHTLY
Qty: 5 ML | Refills: 3 | Status: SHIPPED | OUTPATIENT
Start: 2025-06-02 | End: 2026-06-02

## 2025-06-02 RX ORDER — FLUTICASONE PROPIONATE 50 MCG
2 SPRAY, SUSPENSION (ML) NASAL DAILY
Qty: 48 G | Refills: 0 | Status: SHIPPED | OUTPATIENT
Start: 2025-06-02

## 2025-06-03 ENCOUNTER — PHARMACY VISIT (OUTPATIENT)
Dept: PHARMACY | Facility: CLINIC | Age: 65
End: 2025-06-03
Payer: COMMERCIAL

## 2025-06-03 PROCEDURE — RXMED WILLOW AMBULATORY MEDICATION CHARGE

## 2025-06-06 ENCOUNTER — PHARMACY VISIT (OUTPATIENT)
Dept: PHARMACY | Facility: CLINIC | Age: 65
End: 2025-06-06
Payer: COMMERCIAL

## 2025-06-24 DIAGNOSIS — K21.9 GASTROESOPHAGEAL REFLUX DISEASE WITHOUT ESOPHAGITIS: ICD-10-CM

## 2025-06-24 PROCEDURE — RXMED WILLOW AMBULATORY MEDICATION CHARGE

## 2025-06-24 RX ORDER — OMEPRAZOLE 40 MG/1
40 CAPSULE, DELAYED RELEASE ORAL 2 TIMES DAILY
Qty: 180 CAPSULE | Refills: 0 | Status: SHIPPED | OUTPATIENT
Start: 2025-06-24

## 2025-06-25 ENCOUNTER — PHARMACY VISIT (OUTPATIENT)
Dept: PHARMACY | Facility: CLINIC | Age: 65
End: 2025-06-25
Payer: COMMERCIAL

## 2025-06-25 ENCOUNTER — APPOINTMENT (OUTPATIENT)
Dept: PHARMACY | Facility: HOSPITAL | Age: 65
End: 2025-06-25
Payer: COMMERCIAL

## 2025-06-25 DIAGNOSIS — E11.9 TYPE 2 DIABETES MELLITUS WITHOUT COMPLICATION, WITHOUT LONG-TERM CURRENT USE OF INSULIN: ICD-10-CM

## 2025-06-25 PROCEDURE — RXMED WILLOW AMBULATORY MEDICATION CHARGE

## 2025-06-25 RX ORDER — TIRZEPATIDE 7.5 MG/.5ML
7.5 INJECTION, SOLUTION SUBCUTANEOUS WEEKLY
Qty: 2 ML | Refills: 0 | Status: SHIPPED | OUTPATIENT
Start: 2025-06-25

## 2025-06-25 ASSESSMENT — ENCOUNTER SYMPTOMS: DIABETIC ASSOCIATED SYMPTOMS: 0

## 2025-06-25 NOTE — PROGRESS NOTES
[facility] (VB[facility])    Cathryn Romeo is a 64 y.o. female was referred to Clinical Pharmacy Team to complete a comprehensive medication review (CMR) with a pharmacist as part of the [employer] diabetes program.  Pharmacy team may also provide assistance in diabetes management per discussion with referring provider and/or endocrinology.    Referring Provider: Miguel Ángel Flores MD  Does patient follow with Endocrinology: No  Endocrinology Provider Name: N/A     Subjective   Allergies[1]     [facility]  [address]  [facility] [address]  Phone: [phone] Fax: [phone]    [facility] - LUCAS Majano - [facility] AT Portal to Registered [facility] Sites  [facility]  [place] [address]  Phone: [phone] Fax: [phone]    [facility] #[id number] [facility] [address] [place] [address]  [address] [facility] [address]  Phone: [phone] Fax: [phone]      Diabetes  She presents for her follow-up diabetic visit. She has type 2 diabetes mellitus. Her disease course has been stable. There are no hypoglycemic associated symptoms. There are no diabetic associated symptoms. There are no hypoglycemic complications. Risk factors for coronary artery disease include diabetes mellitus and obesity. Current diabetic treatments: Metformin 1000 mg BID, Mounjaro 5 mg weekly. She is compliant with treatment all of the time. Her weight is decreasing steadily.       Objective     There were no vitals taken for this visit.     LAB  Lab Results   Component Value Date    BILITOT 0.3 01/13/2025    CALCIUM 9.9 01/13/2025    CO2 26 01/13/2025     01/13/2025    CREATININE 0.63 01/13/2025    GLUCOSE 130 (H) 01/13/2025    ALKPHOS 78 01/13/2025    K 4.4 01/13/2025    PROT 7.3 01/13/2025     01/13/2025    AST 17 01/13/2025    ALT 40 01/13/2025     "BUN 19 01/13/2025    ANIONGAP 14 01/13/2025    MG 2.14 01/13/2025    ALBUMIN 4.2 01/13/2025    GFRF >90 12/15/2022     Lab Results   Component Value Date    TRIG 175 (H) 01/13/2025    CHOL 216 (H) 01/13/2025    LDLCALC 129 (H) 01/13/2025    HDL 52.0 01/13/2025     Lab Results   Component Value Date    HGBA1C 6.1 (H) 01/13/2025     Estimated body mass index is 63.65 kg/m² as calculated from the following:    Height as of 11/14/24: 1.575 m (5' 2\").    Weight as of 4/16/25: 158 kg (348 lb).     Current Outpatient Medications on File Prior to Visit   Medication Sig Dispense Refill    blood sugar diagnostic (OneTouch Verio test strips) strip USE TO TEST ONCE DAILY 100 strip 1    clobetasol (Temovate) 0.05 % ointment Apply to vulvar area twice daily for 6 weeks followed by daily until can wean to 2-3 x/week without a flare. 45 g 3    ergocalciferol (Vitamin D-2) 1250 mcg (50,000 units) capsule Take 1 capsule (50,000 Units) by mouth 1 (one) time per week. 12 capsule 0    fexofenadine (Allegra) 180 mg tablet Take 1 tablet (180 mg) by mouth once daily as needed (allergy symptoms). 90 tablet 0    fluticasone (Flonase) 50 mcg/actuation nasal spray Administer 2 sprays into each nostril once daily. Shake gently. Before first use, prime pump. After use, clean tip and replace cap. 48 g 0    ibuprofen 200 mg tablet Take 3 tablets (600 mg) by mouth. PRN PAIN (most every afternoon/evening)      latanoprost (Xalatan) 0.005 % ophthalmic solution Administer 1 drop into both eyes once daily at bedtime. 5 mL 3    lisinopril 20 mg tablet TAKE 1 TABLET BY MOUTH ONCE DAILY 90 tablet 1    metFORMIN (Glucophage) 1,000 mg tablet TAKE 1 TABLET BY MOUTH TWO TIMES A DAY WITH MEALS 180 tablet 1    montelukast (Singulair) 10 mg tablet Take 1 tablet (10 mg) by mouth once daily. 90 tablet 1    omeprazole (PriLOSEC) 40 mg DR capsule Take 1 capsule (40 mg) by mouth 2 times a day. Do not crush or chew. 180 capsule 0    ondansetron ODT (Zofran-ODT) 4 mg " disintegrating tablet Dissolve 1 tablet (4 mg) on the tongue every 4 hours if needed for nausea or vomiting. 20 tablet 0    ONETOUCH DELICA LANCETS MISC       tirzepatide (Mounjaro) 5 mg/0.5 mL pen injector Inject 5 mg under the skin 1 (one) time per week. 2 mL 0    vibegron (Gemtesa) 75 mg tablet Take 1 tablet (75 mg) by mouth once daily. 30 tablet 5    [DISCONTINUED] omeprazole (PriLOSEC) 40 mg DR capsule Take 1 capsule (40 mg) by mouth 2 times a day. Do not crush or chew. 180 capsule 1     No current facility-administered medications on file prior to visit.      Secondary Prevention:  Statin? No  ACE-I/ARB? Yes  Aspirin? No    Pertinent PMH Review:  PMH of Pancreatitis: No  PMH of Retinopathy: No  Has patient had a yearly eye exam? No  PMH of Urinary Tract Infections: No  PMH of MTC: No    ASSESSMENT/PLAN:  - Since last visit, patient has initiated Mounjaro 5 mg weekly and has taken three doses with very minimal GI upset. Given good tolerance, we will increase Mounjaro to 7.5 mg after 3rd dose (start date 6/29). Plan to follow up every 3 weeks for dose titration.     Employee Health Diabetes Program (VBID)  - Patient enrolled in  Employee diabetes program for $0 co-pays on diabetes medications/supplies. Enrollment should be active in 2-4 weeks.   - Refills sent to  pharmacy for diabetes medications/supplies as part of  Employee Diabetes Program (subject to change per provider preferences).  - Requested VBID enrollment date: 4/21/25  - PharmD Management Level: 4  -  Pharmacy fill location: [facility] via Mail    Assessment/Plan   Problem List Items Addressed This Visit       T2DM (type 2 diabetes mellitus) (Multi)    Continue Metformin 1000 mg BID  Increase to Mounjaro 7.5 mg weekly.               Follow up: 7/16    Continue all meds under the continuation of care with the referring provider and clinical pharmacy team.    Iron Leon, PharmD     Verbal consent to manage patient's drug therapy was obtained  from [the patient and/or an individual authorized to act on behalf of a patient]. They were informed they may decline to participate or withdraw from participation in pharmacy services at any time.           [1]   Allergies  Allergen Reactions    Morphine Nausea Only and Other     Hypotension and nausea    vomiting,hypotension    Codeine Other     vomiting,hypotension    Lactose GI Upset    Tramadol Itching and Rash

## 2025-06-26 PROCEDURE — RXMED WILLOW AMBULATORY MEDICATION CHARGE

## 2025-06-28 ENCOUNTER — PHARMACY VISIT (OUTPATIENT)
Dept: PHARMACY | Facility: CLINIC | Age: 65
End: 2025-06-28
Payer: COMMERCIAL

## 2025-06-30 ENCOUNTER — PHARMACY VISIT (OUTPATIENT)
Dept: PHARMACY | Facility: CLINIC | Age: 65
End: 2025-06-30
Payer: COMMERCIAL

## 2025-07-07 PROCEDURE — RXMED WILLOW AMBULATORY MEDICATION CHARGE

## 2025-07-08 ENCOUNTER — PHARMACY VISIT (OUTPATIENT)
Dept: PHARMACY | Facility: CLINIC | Age: 65
End: 2025-07-08
Payer: COMMERCIAL

## 2025-07-16 ENCOUNTER — APPOINTMENT (OUTPATIENT)
Dept: PHARMACY | Facility: HOSPITAL | Age: 65
End: 2025-07-16
Payer: COMMERCIAL

## 2025-07-16 DIAGNOSIS — E11.9 TYPE 2 DIABETES MELLITUS WITHOUT COMPLICATION, WITHOUT LONG-TERM CURRENT USE OF INSULIN: Primary | ICD-10-CM

## 2025-07-16 PROCEDURE — RXMED WILLOW AMBULATORY MEDICATION CHARGE

## 2025-07-16 RX ORDER — TIRZEPATIDE 10 MG/.5ML
10 INJECTION, SOLUTION SUBCUTANEOUS WEEKLY
Qty: 2 ML | Refills: 0 | Status: SHIPPED | OUTPATIENT
Start: 2025-07-16

## 2025-07-16 ASSESSMENT — ENCOUNTER SYMPTOMS: DIABETIC ASSOCIATED SYMPTOMS: 0

## 2025-07-16 NOTE — PROGRESS NOTES
[facility] ([facility])    Cathryn Romeo is a 64 y.o. female was referred to Clinical Pharmacy Team to complete a comprehensive medication review (CMR) with a pharmacist as part of the [employer] diabetes program.  Pharmacy team may also provide assistance in diabetes management per discussion with referring provider and/or endocrinology.    Referring Provider: Miguel Ángel Flores MD  Does patient follow with Endocrinology: No  Endocrinology Provider Name: N/A     Subjective   Allergies[1]     [facility]  [address]  [facility] [address]  Phone: [phone] Fax: [phone]    [facility] - LUCAS Majano - [facility] AT Portal to Registered [facility] Sites  [facility]  [place] [address]  Phone: [phone] Fax: [phone]    [facility] #[id number] [facility] [address] [place] [address]  [address] [facility] [address]  Phone: [phone] Fax: [phone]      Diabetes  She presents for her follow-up diabetic visit. She has type 2 diabetes mellitus. Her disease course has been stable. There are no hypoglycemic associated symptoms. There are no diabetic associated symptoms. There are no hypoglycemic complications. Risk factors for coronary artery disease include diabetes mellitus and obesity. Current diabetic treatments: Metformin 1000 mg BID, Mounjaro 7.5 mg weekly. She is compliant with treatment all of the time. Her weight is decreasing steadily.       Objective     There were no vitals taken for this visit.     LAB  Lab Results   Component Value Date    BILITOT 0.3 01/13/2025    CALCIUM 9.9 01/13/2025    CO2 26 01/13/2025     01/13/2025    CREATININE 0.63 01/13/2025    GLUCOSE 130 (H) 01/13/2025    ALKPHOS 78 01/13/2025    K 4.4 01/13/2025    PROT 7.3 01/13/2025     01/13/2025    AST 17 01/13/2025    ALT 40 01/13/2025     "BUN 19 01/13/2025    ANIONGAP 14 01/13/2025    MG 2.14 01/13/2025    ALBUMIN 4.2 01/13/2025    GFRF >90 12/15/2022     Lab Results   Component Value Date    TRIG 175 (H) 01/13/2025    CHOL 216 (H) 01/13/2025    LDLCALC 129 (H) 01/13/2025    HDL 52.0 01/13/2025     Lab Results   Component Value Date    HGBA1C 6.1 (H) 01/13/2025     Estimated body mass index is 63.65 kg/m² as calculated from the following:    Height as of 11/14/24: 1.575 m (5' 2\").    Weight as of 4/16/25: 158 kg (348 lb).     Current Outpatient Medications on File Prior to Visit   Medication Sig Dispense Refill    blood sugar diagnostic (OneTouch Verio test strips) strip USE TO TEST ONCE DAILY 100 strip 1    clobetasol (Temovate) 0.05 % ointment Apply to vulvar area twice daily for 6 weeks followed by daily until can wean to 2-3 x/week without a flare. 45 g 3    ergocalciferol (Vitamin D-2) 1250 mcg (50,000 units) capsule Take 1 capsule (50,000 Units) by mouth 1 (one) time per week. 12 capsule 0    fexofenadine (Allegra) 180 mg tablet Take 1 tablet (180 mg) by mouth once daily as needed (allergy symptoms). 90 tablet 0    fluticasone (Flonase) 50 mcg/actuation nasal spray Administer 2 sprays into each nostril once daily. Shake gently. Before first use, prime pump. After use, clean tip and replace cap. 48 g 0    ibuprofen 200 mg tablet Take 3 tablets (600 mg) by mouth. PRN PAIN (most every afternoon/evening)      latanoprost (Xalatan) 0.005 % ophthalmic solution Administer 1 drop into both eyes once daily at bedtime. 5 mL 3    lisinopril 20 mg tablet TAKE 1 TABLET BY MOUTH ONCE DAILY 90 tablet 1    metFORMIN (Glucophage) 1,000 mg tablet TAKE 1 TABLET BY MOUTH TWO TIMES A DAY WITH MEALS 180 tablet 1    montelukast (Singulair) 10 mg tablet Take 1 tablet (10 mg) by mouth once daily. 90 tablet 1    omeprazole (PriLOSEC) 40 mg DR capsule Take 1 capsule (40 mg) by mouth 2 times a day. Do not crush or chew. 180 capsule 0    ondansetron ODT (Zofran-ODT) 4 mg " disintegrating tablet Dissolve 1 tablet (4 mg) on the tongue every 4 hours if needed for nausea or vomiting. 20 tablet 0    ONETOUCH DELICA LANCETS MISC       vibegron (Gemtesa) 75 mg tablet Take 1 tablet (75 mg) by mouth once daily. 30 tablet 5    [DISCONTINUED] tirzepatide (Mounjaro) 7.5 mg/0.5 mL pen injector Inject 7.5 mg under the skin 1 (one) time per week. 2 mL 0     No current facility-administered medications on file prior to visit.      Secondary Prevention:  Statin? No  ACE-I/ARB? Yes  Aspirin? No    Pertinent PMH Review:  PMH of Pancreatitis: No  PMH of Retinopathy: No  Has patient had a yearly eye exam? No  PMH of Urinary Tract Infections: No  PMH of MTC: No    ASSESSMENT/PLAN:  - Since last visit, patient has initiated Mounjaro 7.5 mg weekly and has taken two doses with very minimal GI upset. Given good tolerance, we will increase Mounjaro to 10 mg after 4th dose (start date 8/3). Plan to follow up every 3 weeks for dose titration.     Employee Health Diabetes Program (VBID)  - Patient enrolled in  Employee diabetes program for $0 co-pays on diabetes medications/supplies. Enrollment should be active in 2-4 weeks.   - Refills sent to  pharmacy for diabetes medications/supplies as part of  Employee Diabetes Program (subject to change per provider preferences).  - Requested VBID enrollment date: 4/21/25  - PharmD Management Level: 4  -  Pharmacy fill location: [facility] via Mail    Assessment/Plan   Problem List Items Addressed This Visit       T2DM (type 2 diabetes mellitus) (Multi) - Primary    Continue Metformin 1000 mg BID  Increase to Mounjaro 10 mg weekly.             Follow up: 1 month    Continue all meds under the continuation of care with the referring provider and clinical pharmacy team.    Iron Leon, PharmD     Verbal consent to manage patient's drug therapy was obtained from [the patient and/or an individual authorized to act on behalf of a patient]. They were informed they may  decline to participate or withdraw from participation in pharmacy services at any time.             [1]   Allergies  Allergen Reactions    Morphine Nausea Only and Other     Hypotension and nausea    vomiting,hypotension    Codeine Other     vomiting,hypotension    Lactose GI Upset    Tramadol Itching and Rash

## 2025-07-19 PROCEDURE — RXMED WILLOW AMBULATORY MEDICATION CHARGE

## 2025-07-22 ENCOUNTER — PHARMACY VISIT (OUTPATIENT)
Dept: PHARMACY | Facility: CLINIC | Age: 65
End: 2025-07-22
Payer: COMMERCIAL

## 2025-07-23 ENCOUNTER — PHARMACY VISIT (OUTPATIENT)
Dept: PHARMACY | Facility: CLINIC | Age: 65
End: 2025-07-23
Payer: COMMERCIAL

## 2025-08-20 ENCOUNTER — APPOINTMENT (OUTPATIENT)
Dept: PHARMACY | Facility: HOSPITAL | Age: 65
End: 2025-08-20
Payer: COMMERCIAL

## 2025-08-20 DIAGNOSIS — E11.9 TYPE 2 DIABETES MELLITUS WITHOUT COMPLICATION, WITHOUT LONG-TERM CURRENT USE OF INSULIN: ICD-10-CM

## 2025-08-20 PROCEDURE — RXMED WILLOW AMBULATORY MEDICATION CHARGE

## 2025-08-20 RX ORDER — TIRZEPATIDE 10 MG/.5ML
10 INJECTION, SOLUTION SUBCUTANEOUS WEEKLY
Qty: 2 ML | Refills: 2 | Status: SHIPPED | OUTPATIENT
Start: 2025-08-20

## 2025-08-20 ASSESSMENT — ENCOUNTER SYMPTOMS: DIABETIC ASSOCIATED SYMPTOMS: 0

## 2025-08-21 ENCOUNTER — PHARMACY VISIT (OUTPATIENT)
Dept: PHARMACY | Facility: CLINIC | Age: 65
End: 2025-08-21
Payer: COMMERCIAL

## 2025-08-28 DIAGNOSIS — J30.89 NON-SEASONAL ALLERGIC RHINITIS, UNSPECIFIED TRIGGER: ICD-10-CM

## 2025-08-28 PROCEDURE — RXMED WILLOW AMBULATORY MEDICATION CHARGE

## 2025-08-28 RX ORDER — FEXOFENADINE HCL 180 MG/1
180 TABLET ORAL DAILY PRN
Qty: 90 TABLET | Refills: 0 | Status: SHIPPED | OUTPATIENT
Start: 2025-08-28

## 2025-09-04 ENCOUNTER — PHARMACY VISIT (OUTPATIENT)
Dept: PHARMACY | Facility: CLINIC | Age: 65
End: 2025-09-04
Payer: COMMERCIAL

## 2025-10-15 ENCOUNTER — APPOINTMENT (OUTPATIENT)
Dept: PHARMACY | Facility: HOSPITAL | Age: 65
End: 2025-10-15
Payer: COMMERCIAL

## 2025-10-22 ENCOUNTER — APPOINTMENT (OUTPATIENT)
Dept: NEUROLOGY | Facility: CLINIC | Age: 65
End: 2025-10-22
Payer: COMMERCIAL

## 2026-01-14 ENCOUNTER — APPOINTMENT (OUTPATIENT)
Dept: PRIMARY CARE | Facility: CLINIC | Age: 66
End: 2026-01-14
Payer: COMMERCIAL

## 2026-01-20 ENCOUNTER — APPOINTMENT (OUTPATIENT)
Dept: OPHTHALMOLOGY | Facility: CLINIC | Age: 66
End: 2026-01-20
Payer: COMMERCIAL

## 2026-05-20 ENCOUNTER — APPOINTMENT (OUTPATIENT)
Dept: SLEEP MEDICINE | Facility: CLINIC | Age: 66
End: 2026-05-20
Payer: COMMERCIAL